# Patient Record
Sex: FEMALE | Race: WHITE | Employment: OTHER | URBAN - NONMETROPOLITAN AREA
[De-identification: names, ages, dates, MRNs, and addresses within clinical notes are randomized per-mention and may not be internally consistent; named-entity substitution may affect disease eponyms.]

---

## 2017-04-06 PROBLEM — N17.9 AKI (ACUTE KIDNEY INJURY) (HCC): Status: ACTIVE | Noted: 2017-04-06

## 2017-04-06 PROBLEM — R19.7 DIARRHEA: Status: ACTIVE | Noted: 2017-04-06

## 2017-04-07 PROBLEM — Z86.718 HISTORY OF DVT (DEEP VEIN THROMBOSIS): Status: ACTIVE | Noted: 2017-04-07

## 2017-04-07 PROBLEM — N30.01 ACUTE CYSTITIS WITH HEMATURIA: Status: ACTIVE | Noted: 2017-04-07

## 2018-01-01 ENCOUNTER — OFFICE VISIT (OUTPATIENT)
Dept: NEPHROLOGY | Age: 83
End: 2018-01-01
Payer: MEDICARE

## 2018-01-01 ENCOUNTER — TELEPHONE (OUTPATIENT)
Dept: NEPHROLOGY | Age: 83
End: 2018-01-01

## 2018-01-01 ENCOUNTER — APPOINTMENT (OUTPATIENT)
Dept: INTERVENTIONAL RADIOLOGY/VASCULAR | Age: 83
DRG: 291 | End: 2018-01-01
Payer: MEDICARE

## 2018-01-01 ENCOUNTER — APPOINTMENT (OUTPATIENT)
Dept: GENERAL RADIOLOGY | Age: 83
DRG: 291 | End: 2018-01-01
Attending: INTERNAL MEDICINE
Payer: MEDICARE

## 2018-01-01 ENCOUNTER — HOSPITAL ENCOUNTER (INPATIENT)
Age: 83
LOS: 4 days | Discharge: HOME HEALTH | DRG: 291 | End: 2018-03-26
Attending: INTERNAL MEDICINE | Admitting: INTERNAL MEDICINE
Payer: MEDICARE

## 2018-01-01 ENCOUNTER — HOSPITAL ENCOUNTER (INPATIENT)
Age: 83
LOS: 3 days | Discharge: HOME | DRG: 683 | End: 2018-01-13
Attending: INTERNAL MEDICINE | Admitting: INTERNAL MEDICINE
Payer: MEDICARE

## 2018-01-01 VITALS
WEIGHT: 88.2 LBS | DIASTOLIC BLOOD PRESSURE: 74 MMHG | BODY MASS INDEX: 17.81 KG/M2 | HEART RATE: 80 BPM | SYSTOLIC BLOOD PRESSURE: 116 MMHG | OXYGEN SATURATION: 98 %

## 2018-01-01 VITALS
DIASTOLIC BLOOD PRESSURE: 88 MMHG | WEIGHT: 90.8 LBS | HEART RATE: 65 BPM | BODY MASS INDEX: 18.98 KG/M2 | OXYGEN SATURATION: 98 % | SYSTOLIC BLOOD PRESSURE: 162 MMHG

## 2018-01-01 VITALS
HEART RATE: 65 BPM | RESPIRATION RATE: 18 BRPM | BODY MASS INDEX: 20.3 KG/M2 | OXYGEN SATURATION: 92 % | WEIGHT: 96.7 LBS | SYSTOLIC BLOOD PRESSURE: 148 MMHG | TEMPERATURE: 98.4 F | HEIGHT: 58 IN | DIASTOLIC BLOOD PRESSURE: 54 MMHG

## 2018-01-01 VITALS
RESPIRATION RATE: 16 BRPM | WEIGHT: 93 LBS | OXYGEN SATURATION: 97 % | DIASTOLIC BLOOD PRESSURE: 67 MMHG | BODY MASS INDEX: 18.75 KG/M2 | HEART RATE: 61 BPM | HEIGHT: 59 IN | TEMPERATURE: 98.4 F | SYSTOLIC BLOOD PRESSURE: 168 MMHG

## 2018-01-01 VITALS
BODY MASS INDEX: 21.27 KG/M2 | OXYGEN SATURATION: 93 % | WEIGHT: 105.3 LBS | DIASTOLIC BLOOD PRESSURE: 78 MMHG | SYSTOLIC BLOOD PRESSURE: 124 MMHG | HEART RATE: 51 BPM

## 2018-01-01 DIAGNOSIS — N18.4 ANEMIA IN STAGE 4 CHRONIC KIDNEY DISEASE (HCC): ICD-10-CM

## 2018-01-01 DIAGNOSIS — N18.4 CHRONIC KIDNEY DISEASE, STAGE IV (SEVERE) (HCC): Primary | ICD-10-CM

## 2018-01-01 DIAGNOSIS — N17.9 AKI (ACUTE KIDNEY INJURY) (HCC): Primary | ICD-10-CM

## 2018-01-01 DIAGNOSIS — E87.20 METABOLIC ACIDOSIS: ICD-10-CM

## 2018-01-01 DIAGNOSIS — N18.4 CKD (CHRONIC KIDNEY DISEASE), STAGE IV (HCC): Primary | ICD-10-CM

## 2018-01-01 DIAGNOSIS — I10 ESSENTIAL HYPERTENSION: ICD-10-CM

## 2018-01-01 DIAGNOSIS — N17.9 AKI (ACUTE KIDNEY INJURY) (HCC): ICD-10-CM

## 2018-01-01 DIAGNOSIS — D63.1 ANEMIA IN STAGE 4 CHRONIC KIDNEY DISEASE (HCC): ICD-10-CM

## 2018-01-01 DIAGNOSIS — N39.0 URINARY TRACT INFECTION WITH HEMATURIA, SITE UNSPECIFIED: ICD-10-CM

## 2018-01-01 DIAGNOSIS — N18.4 CKD (CHRONIC KIDNEY DISEASE), STAGE IV (HCC): ICD-10-CM

## 2018-01-01 DIAGNOSIS — R31.9 URINARY TRACT INFECTION WITH HEMATURIA, SITE UNSPECIFIED: ICD-10-CM

## 2018-01-01 DIAGNOSIS — N17.9 ACUTE KIDNEY INJURY (HCC): Primary | ICD-10-CM

## 2018-01-01 DIAGNOSIS — J90 PLEURAL EFFUSION, BILATERAL: ICD-10-CM

## 2018-01-01 LAB
ALBUMIN SERPL-MCNC: 2.8 G/DL (ref 3.5–5.1)
ALBUMIN SERPL-MCNC: 3.2 G/DL (ref 3.5–5.1)
ALP BLD-CCNC: 111 U/L (ref 38–126)
ALT SERPL-CCNC: 20 U/L (ref 11–66)
AMORPHOUS: ABNORMAL
ANION GAP SERPL CALCULATED.3IONS-SCNC: 12 MEQ/L (ref 8–16)
ANION GAP SERPL CALCULATED.3IONS-SCNC: 12 MEQ/L (ref 8–16)
ANION GAP SERPL CALCULATED.3IONS-SCNC: 13 MEQ/L (ref 8–16)
ANION GAP SERPL CALCULATED.3IONS-SCNC: 15 MEQ/L (ref 8–16)
ANION GAP SERPL CALCULATED.3IONS-SCNC: 15 MEQ/L (ref 8–16)
ANION GAP SERPL CALCULATED.3IONS-SCNC: 16 MEQ/L (ref 8–16)
ANION GAP SERPL CALCULATED.3IONS-SCNC: 16 MEQ/L (ref 8–16)
ANION GAP SERPL CALCULATED.3IONS-SCNC: 17 MEQ/L (ref 8–16)
ANION GAP SERPL CALCULATED.3IONS-SCNC: 18 MEQ/L (ref 8–16)
ANISOCYTOSIS: ABNORMAL
AST SERPL-CCNC: 23 U/L (ref 5–40)
BACTERIA: ABNORMAL /HPF
BASOPHILIA: SLIGHT
BASOPHILS # BLD: 0.3 %
BASOPHILS # BLD: 1 %
BASOPHILS ABSOLUTE: 0 THOU/MM3 (ref 0–0.1)
BASOPHILS ABSOLUTE: 0.1 THOU/MM3 (ref 0–0.1)
BASOPHILS ABSOLUTE: ABNORMAL /ΜL
BASOPHILS ABSOLUTE: ABNORMAL /ΜL
BASOPHILS RELATIVE PERCENT: ABNORMAL %
BASOPHILS RELATIVE PERCENT: ABNORMAL %
BILIRUB SERPL-MCNC: 0.2 MG/DL (ref 0.3–1.2)
BILIRUBIN DIRECT: < 0.2 MG/DL (ref 0–0.3)
BILIRUBIN URINE: NEGATIVE
BILIRUBIN, URINE: POSITIVE
BLOOD CULTURE, ROUTINE: NORMAL
BLOOD, URINE: ABNORMAL
BLOOD, URINE: POSITIVE
BUN BLDV-MCNC: 24 MG/DL (ref 7–22)
BUN BLDV-MCNC: 26 MG/DL
BUN BLDV-MCNC: 26 MG/DL (ref 7–22)
BUN BLDV-MCNC: 28 MG/DL
BUN BLDV-MCNC: 28 MG/DL (ref 7–22)
BUN BLDV-MCNC: 30 MG/DL
BUN BLDV-MCNC: 32 MG/DL
BUN BLDV-MCNC: 32 MG/DL (ref 7–22)
BUN BLDV-MCNC: 33 MG/DL (ref 7–22)
BUN BLDV-MCNC: 36 MG/DL (ref 7–22)
BUN BLDV-MCNC: 36 MG/DL (ref 7–22)
BUN BLDV-MCNC: 38 MG/DL
CALCIUM IONIZED: 1.13 MMOL/L (ref 1.12–1.32)
CALCIUM SERPL-MCNC: 6.8 MG/DL
CALCIUM SERPL-MCNC: 7.1 MG/DL (ref 8.5–10.5)
CALCIUM SERPL-MCNC: 7.2 MG/DL (ref 8.5–10.5)
CALCIUM SERPL-MCNC: 7.5 MG/DL (ref 8.5–10.5)
CALCIUM SERPL-MCNC: 7.6 MG/DL (ref 8.5–10.5)
CALCIUM SERPL-MCNC: 8.3 MG/DL
CALCIUM SERPL-MCNC: 8.4 MG/DL (ref 8.5–10.5)
CALCIUM SERPL-MCNC: 8.5 MG/DL (ref 8.5–10.5)
CALCIUM SERPL-MCNC: 8.7 MG/DL
CALCIUM SERPL-MCNC: 8.7 MG/DL (ref 8.5–10.5)
CALCIUM SERPL-MCNC: 9.1 MG/DL
CALCIUM SERPL-MCNC: 9.2 MG/DL
CALCIUM SERPL-MCNC: 9.2 MG/DL (ref 8.5–10.5)
CALCIUM SERPL-MCNC: 9.3 MG/DL (ref 8.5–10.5)
CASTS 2: ABNORMAL /LPF
CASTS UA: ABNORMAL /LPF
CHARACTER, URINE: ABNORMAL
CHLORIDE BLD-SCNC: 102 MEQ/L (ref 98–111)
CHLORIDE BLD-SCNC: 103 MEQ/L (ref 98–111)
CHLORIDE BLD-SCNC: 104 MMOL/L
CHLORIDE BLD-SCNC: 106 MMOL/L
CHLORIDE BLD-SCNC: 109 MMOL/L
CHLORIDE BLD-SCNC: 111 MEQ/L (ref 98–111)
CHLORIDE BLD-SCNC: 111 MEQ/L (ref 98–111)
CHLORIDE BLD-SCNC: 112 MEQ/L (ref 98–111)
CHLORIDE BLD-SCNC: 113 MMOL/L
CHLORIDE BLD-SCNC: 115 MEQ/L (ref 98–111)
CHLORIDE BLD-SCNC: 90 MEQ/L (ref 98–111)
CHLORIDE BLD-SCNC: 93 MEQ/L (ref 98–111)
CHLORIDE BLD-SCNC: 93 MEQ/L (ref 98–111)
CHLORIDE BLD-SCNC: 99 MMOL/L
CHLORIDE, URINE: 35 MEQ/L
CLARITY: ABNORMAL
CO2: 18 MEQ/L (ref 23–33)
CO2: 18 MEQ/L (ref 23–33)
CO2: 18 MMOL/L
CO2: 19 MEQ/L (ref 23–33)
CO2: 19 MMOL/L
CO2: 20 MEQ/L (ref 23–33)
CO2: 23 MMOL/L
CO2: 24 MEQ/L (ref 23–33)
CO2: 24 MEQ/L (ref 23–33)
CO2: 25 MEQ/L (ref 23–33)
CO2: 26 MEQ/L (ref 23–33)
CO2: 27 MMOL/L
CO2: 29 MEQ/L (ref 23–33)
CO2: 30 MMOL/L
COLOR: ABNORMAL
COLOR: ABNORMAL
CREAT SERPL-MCNC: 1.6 MG/DL (ref 0.4–1.2)
CREAT SERPL-MCNC: 1.9 MG/DL (ref 0.4–1.2)
CREAT SERPL-MCNC: 2.3 MG/DL
CREAT SERPL-MCNC: 2.3 MG/DL (ref 0.4–1.2)
CREAT SERPL-MCNC: 2.4 MG/DL
CREAT SERPL-MCNC: 2.4 MG/DL (ref 0.4–1.2)
CREAT SERPL-MCNC: 2.5 MG/DL
CREAT SERPL-MCNC: 2.5 MG/DL (ref 0.4–1.2)
CREAT SERPL-MCNC: 2.7 MG/DL (ref 0.4–1.2)
CREAT SERPL-MCNC: 2.8 MG/DL
CREAT SERPL-MCNC: 2.8 MG/DL (ref 0.4–1.2)
CREAT SERPL-MCNC: 3 MG/DL (ref 0.4–1.2)
CREAT SERPL-MCNC: 3.2 MG/DL (ref 0.4–1.2)
CREAT SERPL-MCNC: 3.8 MG/DL
CREATININE URINE: 66.8 MG/DL
CRYSTALS, UA: ABNORMAL
D-DIMER QUANTITATIVE: 855 NG/ML FEU (ref 0–500)
EKG ATRIAL RATE: 150 BPM
EKG ATRIAL RATE: 57 BPM
EKG ATRIAL RATE: 64 BPM
EKG P AXIS: 19 DEGREES
EKG P AXIS: 52 DEGREES
EKG P-R INTERVAL: 146 MS
EKG P-R INTERVAL: 170 MS
EKG Q-T INTERVAL: 336 MS
EKG Q-T INTERVAL: 424 MS
EKG Q-T INTERVAL: 496 MS
EKG QRS DURATION: 76 MS
EKG QRS DURATION: 92 MS
EKG QRS DURATION: 94 MS
EKG QTC CALCULATION (BAZETT): 437 MS
EKG QTC CALCULATION (BAZETT): 482 MS
EKG QTC CALCULATION (BAZETT): 501 MS
EKG R AXIS: -26 DEGREES
EKG R AXIS: -44 DEGREES
EKG R AXIS: -48 DEGREES
EKG T AXIS: -158 DEGREES
EKG T AXIS: -3 DEGREES
EKG T AXIS: 67 DEGREES
EKG VENTRICULAR RATE: 134 BPM
EKG VENTRICULAR RATE: 57 BPM
EKG VENTRICULAR RATE: 64 BPM
EOSINOPHIL # BLD: 1.5 %
EOSINOPHIL # BLD: 3 %
EOSINOPHILS ABSOLUTE: 0.1 THOU/MM3 (ref 0–0.4)
EOSINOPHILS ABSOLUTE: 0.2 THOU/MM3 (ref 0–0.4)
EOSINOPHILS ABSOLUTE: ABNORMAL /ΜL
EOSINOPHILS ABSOLUTE: ABNORMAL /ΜL
EOSINOPHILS RELATIVE PERCENT: ABNORMAL %
EOSINOPHILS RELATIVE PERCENT: ABNORMAL %
EPITHELIAL CELLS, UA: ABNORMAL /HPF
FERRITIN: 291 NG/ML (ref 10–291)
FLU A ANTIGEN: NEGATIVE
FLU B ANTIGEN: NEGATIVE
GFR CALCULATED: 12
GFR CALCULATED: 17
GFR CALCULATED: 19
GFR CALCULATED: 20
GFR CALCULATED: 21
GFR SERPL CREATININE-BSD FRML MDRD: 14 ML/MIN/1.73M2
GFR SERPL CREATININE-BSD FRML MDRD: 15 ML/MIN/1.73M2
GFR SERPL CREATININE-BSD FRML MDRD: 16 ML/MIN/1.73M2
GFR SERPL CREATININE-BSD FRML MDRD: 17 ML/MIN/1.73M2
GFR SERPL CREATININE-BSD FRML MDRD: 18 ML/MIN/1.73M2
GFR SERPL CREATININE-BSD FRML MDRD: 19 ML/MIN/1.73M2
GFR SERPL CREATININE-BSD FRML MDRD: 20 ML/MIN/1.73M2
GFR SERPL CREATININE-BSD FRML MDRD: 25 ML/MIN/1.73M2
GFR SERPL CREATININE-BSD FRML MDRD: 30 ML/MIN/1.73M2
GLUCOSE BLD-MCNC: 104 MG/DL
GLUCOSE BLD-MCNC: 105 MG/DL (ref 70–108)
GLUCOSE BLD-MCNC: 108 MG/DL (ref 70–108)
GLUCOSE BLD-MCNC: 115 MG/DL
GLUCOSE BLD-MCNC: 116 MG/DL (ref 70–108)
GLUCOSE BLD-MCNC: 79 MG/DL (ref 70–108)
GLUCOSE BLD-MCNC: 83 MG/DL
GLUCOSE BLD-MCNC: 83 MG/DL (ref 70–108)
GLUCOSE BLD-MCNC: 86 MG/DL (ref 70–108)
GLUCOSE BLD-MCNC: 86 MG/DL (ref 70–108)
GLUCOSE BLD-MCNC: 88 MG/DL (ref 70–108)
GLUCOSE BLD-MCNC: 90 MG/DL (ref 70–108)
GLUCOSE BLD-MCNC: 93 MG/DL
GLUCOSE BLD-MCNC: 97 MG/DL
GLUCOSE URINE: 100
GLUCOSE URINE: NEGATIVE MG/DL
HCT VFR BLD CALC: 27.6 % (ref 37–47)
HCT VFR BLD CALC: 28.7 % (ref 37–47)
HCT VFR BLD CALC: 29.2 % (ref 37–47)
HCT VFR BLD CALC: 29.8 % (ref 36–46)
HCT VFR BLD CALC: 31.5 % (ref 36–46)
HCT VFR BLD CALC: 32 % (ref 37–47)
HEMOGLOBIN: 10 G/DL (ref 12–16)
HEMOGLOBIN: 10.8 GM/DL (ref 12–16)
HEMOGLOBIN: 8.9 G/DL (ref 12–16)
HEMOGLOBIN: 8.9 GM/DL (ref 12–16)
HEMOGLOBIN: 9 GM/DL (ref 12–16)
HEMOGLOBIN: 9.4 GM/DL (ref 12–16)
IMMUNOFIXATION ELECTROPHORESIS: NORMAL
INR BLD: 1.15 (ref 0.85–1.13)
INR BLD: 1.29 (ref 0.85–1.13)
INR BLD: 1.31 (ref 0.85–1.13)
INR BLD: 1.43 (ref 0.85–1.13)
INR BLD: 1.7
INR BLD: 2.02 (ref 0.85–1.13)
INR BLD: 2.05 (ref 0.85–1.13)
INR BLD: 2.07 (ref 0.85–1.13)
INR BLD: 2.23 (ref 0.85–1.13)
INR BLD: 2.49 (ref 0.85–1.13)
IRON SATURATION: 43 % (ref 20–50)
IRON: 64 UG/DL (ref 50–170)
KAPPA/LAMBDA FREE LIGHT CHAINS: NORMAL
KETONES, URINE: NEGATIVE
KETONES, URINE: NEGATIVE
LEGIONELLA URINARY AG: NEGATIVE
LEUKOCYTE ESTERASE, URINE: ABNORMAL
LEUKOCYTE ESTERASE, URINE: POSITIVE
LV EF: 64 %
LVEF MODALITY: NORMAL
LYMPHOCYTES # BLD: 20.9 %
LYMPHOCYTES # BLD: 23.8 %
LYMPHOCYTES ABSOLUTE: 1.7 THOU/MM3 (ref 1–4.8)
LYMPHOCYTES ABSOLUTE: 2.1 THOU/MM3 (ref 1–4.8)
LYMPHOCYTES ABSOLUTE: ABNORMAL /ΜL
LYMPHOCYTES ABSOLUTE: ABNORMAL /ΜL
LYMPHOCYTES RELATIVE PERCENT: 2.8 %
LYMPHOCYTES RELATIVE PERCENT: ABNORMAL %
MACROCYTES: ABNORMAL
MAGNESIUM: 1.5 MG/DL (ref 1.6–2.4)
MAGNESIUM: 1.9 MG/DL (ref 1.6–2.4)
MCH RBC QN AUTO: 31.2 PG (ref 27–31)
MCH RBC QN AUTO: 32.9 PG (ref 27–31)
MCH RBC QN AUTO: 33.1 PG (ref 27–31)
MCH RBC QN AUTO: 33.4 PG (ref 27–31)
MCH RBC QN AUTO: ABNORMAL PG
MCH RBC QN AUTO: ABNORMAL PG
MCHC RBC AUTO-ENTMCNC: 31.1 GM/DL (ref 33–37)
MCHC RBC AUTO-ENTMCNC: 32.1 GM/DL (ref 33–37)
MCHC RBC AUTO-ENTMCNC: 32.7 GM/DL (ref 33–37)
MCHC RBC AUTO-ENTMCNC: 33.8 GM/DL (ref 33–37)
MCHC RBC AUTO-ENTMCNC: ABNORMAL G/DL
MCHC RBC AUTO-ENTMCNC: ABNORMAL G/DL
MCV RBC AUTO: 100.5 FL (ref 81–99)
MCV RBC AUTO: 101.4 FL (ref 81–99)
MCV RBC AUTO: 102.5 FL (ref 81–99)
MCV RBC AUTO: 98.8 FL (ref 81–99)
MCV RBC AUTO: ABNORMAL FL
MCV RBC AUTO: ABNORMAL FL
MISCELLANEOUS 2: ABNORMAL
MONOCYTES # BLD: 10.1 %
MONOCYTES # BLD: 8.8 %
MONOCYTES ABSOLUTE: 0.8 THOU/MM3 (ref 0.4–1.3)
MONOCYTES ABSOLUTE: 0.8 THOU/MM3 (ref 0.4–1.3)
MONOCYTES ABSOLUTE: ABNORMAL /ΜL
MONOCYTES ABSOLUTE: ABNORMAL /ΜL
MONOCYTES RELATIVE PERCENT: 0.4 %
MONOCYTES RELATIVE PERCENT: ABNORMAL %
MRSA SCREEN RT-PCR: NEGATIVE
MRSA SCREEN: NORMAL
NEUTROPHILS ABSOLUTE: ABNORMAL /ΜL
NEUTROPHILS ABSOLUTE: ABNORMAL /ΜL
NEUTROPHILS RELATIVE PERCENT: 2.9 %
NEUTROPHILS RELATIVE PERCENT: ABNORMAL %
NITRITE, URINE: POSITIVE
NITRITE, URINE: POSITIVE
NUCLEATED RED BLOOD CELLS: 0 /100 WBC
NUCLEATED RED BLOOD CELLS: 0 /100 WBC
OSMOLALITY CALCULATION: 293.1 MOSMOL/KG (ref 275–300)
OSMOLALITY CALCULATION: 295.3 MOSMOL/KG (ref 275–300)
PDW BLD-RTO: 14.1 % (ref 11.5–14.5)
PDW BLD-RTO: 16.4 % (ref 11.5–14.5)
PDW BLD-RTO: 16.4 % (ref 11.5–14.5)
PDW BLD-RTO: 18.1 % (ref 11.5–14.5)
PH UA: 5.5 (ref 4.5–8)
PH UA: 6
PH VENOUS: 7.4 (ref 7.33–7.43)
PLATELET # BLD: 133 THOU/MM3 (ref 130–400)
PLATELET # BLD: 144 THOU/MM3 (ref 130–400)
PLATELET # BLD: 148 THOU/MM3 (ref 130–400)
PLATELET # BLD: 158 THOU/MM3 (ref 130–400)
PLATELET # BLD: 177 K/ΜL
PLATELET # BLD: 188 K/ΜL
PLATELET ESTIMATE: ADEQUATE
PMV BLD AUTO: 8.1 MCM (ref 7.4–10.4)
PMV BLD AUTO: 8.3 MCM (ref 7.4–10.4)
PMV BLD AUTO: 8.4 FL (ref 7.4–10.4)
PMV BLD AUTO: 8.6 MCM (ref 7.4–10.4)
PMV BLD AUTO: ABNORMAL FL
PMV BLD AUTO: ABNORMAL FL
POTASSIUM REFLEX MAGNESIUM: 3.4 MEQ/L (ref 3.5–5.2)
POTASSIUM REFLEX MAGNESIUM: 3.7 MEQ/L (ref 3.5–5.2)
POTASSIUM REFLEX MAGNESIUM: 4.4 MEQ/L (ref 3.5–5.2)
POTASSIUM SERPL-SCNC: 3.5 MEQ/L (ref 3.5–5.2)
POTASSIUM SERPL-SCNC: 3.6 MMOL/L
POTASSIUM SERPL-SCNC: 3.7 MEQ/L (ref 3.5–5.2)
POTASSIUM SERPL-SCNC: 3.8 MEQ/L (ref 3.5–5.2)
POTASSIUM SERPL-SCNC: 3.8 MMOL/L
POTASSIUM SERPL-SCNC: 4.1 MEQ/L (ref 3.5–5.2)
POTASSIUM SERPL-SCNC: 4.1 MMOL/L
POTASSIUM SERPL-SCNC: 4.3 MMOL/L
POTASSIUM SERPL-SCNC: 4.4 MEQ/L (ref 3.5–5.2)
POTASSIUM SERPL-SCNC: 4.8 MMOL/L
POTASSIUM SERPL-SCNC: 5 MEQ/L (ref 3.5–5.2)
POTASSIUM SERPL-SCNC: 5.3 MEQ/L (ref 3.5–5.2)
PRO-BNP: ABNORMAL PG/ML (ref 0–1800)
PROCALCITONIN: 0.41 NG/ML (ref 0.01–0.09)
PROT/CREAT RATIO, UR: 0.56
PROTEIN UA: 100
PROTEIN UA: ABNORMAL
PROTEIN, URINE: 37.6 MG/DL
PROTIME: NORMAL SECONDS
PTH INTACT: 99.4 PG/ML (ref 15–65)
RBC # BLD: 2.73 MILL/MM3 (ref 4.2–5.4)
RBC # BLD: 2.75 10^6/ΜL
RBC # BLD: 2.85 MILL/MM3 (ref 4.2–5.4)
RBC # BLD: 2.86 MILL/MM3 (ref 4.2–5.4)
RBC # BLD: 3.06 10^6/ΜL
RBC # BLD: 3.24 MILL/MM3 (ref 4.2–5.4)
RBC URINE: ABNORMAL /HPF
RENAL EPITHELIAL, UA: ABNORMAL
SCAN OF BLOOD SMEAR: NORMAL
SEG NEUTROPHILS: 65 %
SEG NEUTROPHILS: 65.6 %
SEGMENTED NEUTROPHILS ABSOLUTE COUNT: 5.2 THOU/MM3 (ref 1.8–7.7)
SEGMENTED NEUTROPHILS ABSOLUTE COUNT: 5.7 THOU/MM3 (ref 1.8–7.7)
SODIUM BLD-SCNC: 132 MEQ/L (ref 135–145)
SODIUM BLD-SCNC: 133 MEQ/L (ref 135–145)
SODIUM BLD-SCNC: 134 MEQ/L (ref 135–145)
SODIUM BLD-SCNC: 142 MMOL/L
SODIUM BLD-SCNC: 143 MEQ/L (ref 135–145)
SODIUM BLD-SCNC: 143 MMOL/L
SODIUM BLD-SCNC: 143 MMOL/L
SODIUM BLD-SCNC: 144 MEQ/L (ref 135–145)
SODIUM BLD-SCNC: 144 MMOL/L
SODIUM BLD-SCNC: 144 MMOL/L
SODIUM BLD-SCNC: 145 MEQ/L (ref 135–145)
SODIUM BLD-SCNC: 146 MEQ/L (ref 135–145)
SODIUM URINE: 134 MEQ/L
SODIUM URINE: 76 MEQ/L
SPECIFIC GRAVITY, URINE: 1.01
SPECIFIC GRAVITY, URINE: 1.01 (ref 1–1.03)
STREP PNEUMO AG, UR: NEGATIVE
THYROXINE (T4): 5.9 UG/DL (ref 4.5–12)
TOTAL IRON BINDING CAPACITY: 150 UG/DL (ref 171–450)
TOTAL PROTEIN: 5.7 G/DL (ref 6.1–8)
TROPONIN T: 0.01 NG/ML
TROPONIN T: 0.02 NG/ML
TROPONIN T: 0.02 NG/ML
TSH SERPL DL<=0.05 MIU/L-ACNC: 2.34 UIU/ML (ref 0.4–4.2)
URINE CULTURE REFLEX: NORMAL
UROBILINOGEN, URINE: 1 EU/DL
UROBILINOGEN, URINE: NORMAL
VITAMIN D 25-HYDROXY: 41 NG/ML (ref 30–100)
VRE CULTURE: NORMAL
WBC # BLD: 5.7 THOU/MM3 (ref 4.8–10.8)
WBC # BLD: 6.1 10^3/ML
WBC # BLD: 6.6 10^3/ML
WBC # BLD: 8 THOU/MM3 (ref 4.8–10.8)
WBC # BLD: 8 THOU/MM3 (ref 4.8–10.8)
WBC # BLD: 8.7 THOU/MM3 (ref 4.8–10.8)
WBC UA: ABNORMAL /HPF
YEAST: ABNORMAL

## 2018-01-01 PROCEDURE — 6360000002 HC RX W HCPCS: Performed by: PHYSICIAN ASSISTANT

## 2018-01-01 PROCEDURE — 99232 SBSQ HOSP IP/OBS MODERATE 35: CPT | Performed by: INTERNAL MEDICINE

## 2018-01-01 PROCEDURE — 80048 BASIC METABOLIC PNL TOTAL CA: CPT

## 2018-01-01 PROCEDURE — 2580000003 HC RX 258: Performed by: NURSE PRACTITIONER

## 2018-01-01 PROCEDURE — 93306 TTE W/DOPPLER COMPLETE: CPT

## 2018-01-01 PROCEDURE — 81001 URINALYSIS AUTO W/SCOPE: CPT

## 2018-01-01 PROCEDURE — 6370000000 HC RX 637 (ALT 250 FOR IP): Performed by: INTERNAL MEDICINE

## 2018-01-01 PROCEDURE — 2060000000 HC ICU INTERMEDIATE R&B

## 2018-01-01 PROCEDURE — 87147 CULTURE TYPE IMMUNOLOGIC: CPT

## 2018-01-01 PROCEDURE — 99223 1ST HOSP IP/OBS HIGH 75: CPT | Performed by: INTERNAL MEDICINE

## 2018-01-01 PROCEDURE — 84165 PROTEIN E-PHORESIS SERUM: CPT

## 2018-01-01 PROCEDURE — 87449 NOS EACH ORGANISM AG IA: CPT

## 2018-01-01 PROCEDURE — 6370000000 HC RX 637 (ALT 250 FOR IP)

## 2018-01-01 PROCEDURE — 2580000003 HC RX 258: Performed by: INTERNAL MEDICINE

## 2018-01-01 PROCEDURE — G8978 MOBILITY CURRENT STATUS: HCPCS

## 2018-01-01 PROCEDURE — 87899 AGENT NOS ASSAY W/OPTIC: CPT

## 2018-01-01 PROCEDURE — 2700000000 HC OXYGEN THERAPY PER DAY

## 2018-01-01 PROCEDURE — 36415 COLL VENOUS BLD VENIPUNCTURE: CPT

## 2018-01-01 PROCEDURE — 97110 THERAPEUTIC EXERCISES: CPT

## 2018-01-01 PROCEDURE — 93971 EXTREMITY STUDY: CPT

## 2018-01-01 PROCEDURE — 85610 PROTHROMBIN TIME: CPT

## 2018-01-01 PROCEDURE — 84484 ASSAY OF TROPONIN QUANT: CPT

## 2018-01-01 PROCEDURE — 97165 OT EVAL LOW COMPLEX 30 MIN: CPT

## 2018-01-01 PROCEDURE — 85379 FIBRIN DEGRADATION QUANT: CPT

## 2018-01-01 PROCEDURE — 82800 BLOOD PH: CPT

## 2018-01-01 PROCEDURE — 84443 ASSAY THYROID STIM HORMONE: CPT

## 2018-01-01 PROCEDURE — 85025 COMPLETE CBC W/AUTO DIFF WBC: CPT

## 2018-01-01 PROCEDURE — 97162 PT EVAL MOD COMPLEX 30 MIN: CPT

## 2018-01-01 PROCEDURE — 83540 ASSAY OF IRON: CPT

## 2018-01-01 PROCEDURE — 97116 GAIT TRAINING THERAPY: CPT

## 2018-01-01 PROCEDURE — 2580000003 HC RX 258: Performed by: EMERGENCY MEDICINE

## 2018-01-01 PROCEDURE — 84300 ASSAY OF URINE SODIUM: CPT

## 2018-01-01 PROCEDURE — 6360000002 HC RX W HCPCS: Performed by: INTERNAL MEDICINE

## 2018-01-01 PROCEDURE — 6370000000 HC RX 637 (ALT 250 FOR IP): Performed by: PHARMACIST

## 2018-01-01 PROCEDURE — 85027 COMPLETE CBC AUTOMATED: CPT

## 2018-01-01 PROCEDURE — 87641 MR-STAPH DNA AMP PROBE: CPT

## 2018-01-01 PROCEDURE — 94640 AIRWAY INHALATION TREATMENT: CPT

## 2018-01-01 PROCEDURE — 83970 ASSAY OF PARATHORMONE: CPT

## 2018-01-01 PROCEDURE — 6370000000 HC RX 637 (ALT 250 FOR IP): Performed by: FAMILY MEDICINE

## 2018-01-01 PROCEDURE — APPSS60 APP SPLIT SHARED TIME 46-60 MINUTES: Performed by: NURSE PRACTITIONER

## 2018-01-01 PROCEDURE — 87804 INFLUENZA ASSAY W/OPTIC: CPT

## 2018-01-01 PROCEDURE — 99239 HOSP IP/OBS DSCHRG MGMT >30: CPT | Performed by: INTERNAL MEDICINE

## 2018-01-01 PROCEDURE — 82248 BILIRUBIN DIRECT: CPT

## 2018-01-01 PROCEDURE — 71046 X-RAY EXAM CHEST 2 VIEWS: CPT

## 2018-01-01 PROCEDURE — 80053 COMPREHEN METABOLIC PANEL: CPT

## 2018-01-01 PROCEDURE — 1200000003 HC TELEMETRY R&B

## 2018-01-01 PROCEDURE — 82784 ASSAY IGA/IGD/IGG/IGM EACH: CPT

## 2018-01-01 PROCEDURE — G8979 MOBILITY GOAL STATUS: HCPCS

## 2018-01-01 PROCEDURE — 93010 ELECTROCARDIOGRAM REPORT: CPT | Performed by: NUCLEAR MEDICINE

## 2018-01-01 PROCEDURE — APPSS30 APP SPLIT SHARED TIME 16-30 MINUTES: Performed by: NURSE PRACTITIONER

## 2018-01-01 PROCEDURE — 84436 ASSAY OF TOTAL THYROXINE: CPT

## 2018-01-01 PROCEDURE — 6370000000 HC RX 637 (ALT 250 FOR IP): Performed by: NURSE PRACTITIONER

## 2018-01-01 PROCEDURE — 87040 BLOOD CULTURE FOR BACTERIA: CPT

## 2018-01-01 PROCEDURE — 97166 OT EVAL MOD COMPLEX 45 MIN: CPT

## 2018-01-01 PROCEDURE — 87081 CULTURE SCREEN ONLY: CPT

## 2018-01-01 PROCEDURE — 99222 1ST HOSP IP/OBS MODERATE 55: CPT | Performed by: INTERNAL MEDICINE

## 2018-01-01 PROCEDURE — 99214 OFFICE O/P EST MOD 30 MIN: CPT | Performed by: INTERNAL MEDICINE

## 2018-01-01 PROCEDURE — 82728 ASSAY OF FERRITIN: CPT

## 2018-01-01 PROCEDURE — 82040 ASSAY OF SERUM ALBUMIN: CPT

## 2018-01-01 PROCEDURE — 84156 ASSAY OF PROTEIN URINE: CPT

## 2018-01-01 PROCEDURE — 97530 THERAPEUTIC ACTIVITIES: CPT

## 2018-01-01 PROCEDURE — 82570 ASSAY OF URINE CREATININE: CPT

## 2018-01-01 PROCEDURE — 99222 1ST HOSP IP/OBS MODERATE 55: CPT | Performed by: PHYSICIAN ASSISTANT

## 2018-01-01 PROCEDURE — 6360000002 HC RX W HCPCS: Performed by: NURSE PRACTITIONER

## 2018-01-01 PROCEDURE — 82306 VITAMIN D 25 HYDROXY: CPT

## 2018-01-01 PROCEDURE — 99213 OFFICE O/P EST LOW 20 MIN: CPT | Performed by: INTERNAL MEDICINE

## 2018-01-01 PROCEDURE — 99284 EMERGENCY DEPT VISIT MOD MDM: CPT

## 2018-01-01 PROCEDURE — 82330 ASSAY OF CALCIUM: CPT

## 2018-01-01 PROCEDURE — 86334 IMMUNOFIX E-PHORESIS SERUM: CPT

## 2018-01-01 PROCEDURE — 99221 1ST HOSP IP/OBS SF/LOW 40: CPT | Performed by: INTERNAL MEDICINE

## 2018-01-01 PROCEDURE — G8987 SELF CARE CURRENT STATUS: HCPCS

## 2018-01-01 PROCEDURE — 94010 BREATHING CAPACITY TEST: CPT

## 2018-01-01 PROCEDURE — 99238 HOSP IP/OBS DSCHRG MGMT 30/<: CPT | Performed by: INTERNAL MEDICINE

## 2018-01-01 PROCEDURE — 6360000002 HC RX W HCPCS: Performed by: EMERGENCY MEDICINE

## 2018-01-01 PROCEDURE — 94669 MECHANICAL CHEST WALL OSCILL: CPT

## 2018-01-01 PROCEDURE — 1200000000 HC SEMI PRIVATE

## 2018-01-01 PROCEDURE — 93005 ELECTROCARDIOGRAM TRACING: CPT | Performed by: INTERNAL MEDICINE

## 2018-01-01 PROCEDURE — 96374 THER/PROPH/DIAG INJ IV PUSH: CPT

## 2018-01-01 PROCEDURE — 99232 SBSQ HOSP IP/OBS MODERATE 35: CPT | Performed by: NURSE PRACTITIONER

## 2018-01-01 PROCEDURE — 84160 ASSAY OF PROTEIN ANY SOURCE: CPT

## 2018-01-01 PROCEDURE — 83883 ASSAY NEPHELOMETRY NOT SPEC: CPT

## 2018-01-01 PROCEDURE — 93005 ELECTROCARDIOGRAM TRACING: CPT

## 2018-01-01 PROCEDURE — G8988 SELF CARE GOAL STATUS: HCPCS

## 2018-01-01 PROCEDURE — 2580000003 HC RX 258: Performed by: PHYSICIAN ASSISTANT

## 2018-01-01 PROCEDURE — 84145 PROCALCITONIN (PCT): CPT

## 2018-01-01 PROCEDURE — 99231 SBSQ HOSP IP/OBS SF/LOW 25: CPT | Performed by: INTERNAL MEDICINE

## 2018-01-01 PROCEDURE — 97535 SELF CARE MNGMENT TRAINING: CPT

## 2018-01-01 PROCEDURE — 82436 ASSAY OF URINE CHLORIDE: CPT

## 2018-01-01 PROCEDURE — 87086 URINE CULTURE/COLONY COUNT: CPT

## 2018-01-01 PROCEDURE — 83735 ASSAY OF MAGNESIUM: CPT

## 2018-01-01 PROCEDURE — 99999 PR OFFICE/OUTPT VISIT,PROCEDURE ONLY: CPT | Performed by: NURSE PRACTITIONER

## 2018-01-01 PROCEDURE — 97161 PT EVAL LOW COMPLEX 20 MIN: CPT

## 2018-01-01 PROCEDURE — 83880 ASSAY OF NATRIURETIC PEPTIDE: CPT

## 2018-01-01 PROCEDURE — 99233 SBSQ HOSP IP/OBS HIGH 50: CPT | Performed by: INTERNAL MEDICINE

## 2018-01-01 PROCEDURE — 83550 IRON BINDING TEST: CPT

## 2018-01-01 RX ORDER — HEPARIN SODIUM 5000 [USP'U]/ML
5000 INJECTION, SOLUTION INTRAVENOUS; SUBCUTANEOUS 2 TIMES DAILY
Status: DISCONTINUED | OUTPATIENT
Start: 2018-01-01 | End: 2018-01-01 | Stop reason: ALTCHOICE

## 2018-01-01 RX ORDER — MAGNESIUM SULFATE IN WATER 40 MG/ML
2 INJECTION, SOLUTION INTRAVENOUS ONCE
Status: COMPLETED | OUTPATIENT
Start: 2018-01-01 | End: 2018-01-01

## 2018-01-01 RX ORDER — HYDRALAZINE HYDROCHLORIDE 20 MG/ML
10 INJECTION INTRAMUSCULAR; INTRAVENOUS ONCE
Status: COMPLETED | OUTPATIENT
Start: 2018-01-01 | End: 2018-01-01

## 2018-01-01 RX ORDER — WARFARIN SODIUM 1 MG/1
1 TABLET ORAL DAILY
Status: DISCONTINUED | OUTPATIENT
Start: 2018-01-01 | End: 2018-01-01 | Stop reason: ALTCHOICE

## 2018-01-01 RX ORDER — SODIUM CHLORIDE 0.9 % (FLUSH) 0.9 %
10 SYRINGE (ML) INJECTION PRN
Status: DISCONTINUED | OUTPATIENT
Start: 2018-01-01 | End: 2018-01-01 | Stop reason: HOSPADM

## 2018-01-01 RX ORDER — IPRATROPIUM BROMIDE AND ALBUTEROL SULFATE 2.5; .5 MG/3ML; MG/3ML
1 SOLUTION RESPIRATORY (INHALATION)
Status: DISCONTINUED | OUTPATIENT
Start: 2018-01-01 | End: 2018-01-01 | Stop reason: HOSPADM

## 2018-01-01 RX ORDER — ASPIRIN 81 MG/1
81 TABLET, CHEWABLE ORAL DAILY
Qty: 30 TABLET | Refills: 3 | Status: SHIPPED | OUTPATIENT
Start: 2018-01-01

## 2018-01-01 RX ORDER — METOPROLOL TARTRATE 50 MG/1
50 TABLET, FILM COATED ORAL 2 TIMES DAILY
Status: DISCONTINUED | OUTPATIENT
Start: 2018-01-01 | End: 2018-01-01 | Stop reason: HOSPADM

## 2018-01-01 RX ORDER — ISOSORBIDE DINITRATE 10 MG/1
5 TABLET ORAL 3 TIMES DAILY
Status: DISCONTINUED | OUTPATIENT
Start: 2018-01-01 | End: 2018-01-01 | Stop reason: HOSPADM

## 2018-01-01 RX ORDER — ONDANSETRON 4 MG/1
4 TABLET, ORALLY DISINTEGRATING ORAL EVERY 6 HOURS PRN
Status: DISCONTINUED | OUTPATIENT
Start: 2018-01-01 | End: 2018-01-01 | Stop reason: HOSPADM

## 2018-01-01 RX ORDER — LEVOFLOXACIN 250 MG/1
250 TABLET ORAL DAILY
Qty: 4 TABLET | Refills: 0 | Status: SHIPPED | OUTPATIENT
Start: 2018-01-01 | End: 2018-01-01 | Stop reason: HOSPADM

## 2018-01-01 RX ORDER — WARFARIN SODIUM 1 MG/1
1 TABLET ORAL
Status: COMPLETED | OUTPATIENT
Start: 2018-01-01 | End: 2018-01-01

## 2018-01-01 RX ORDER — WARFARIN SODIUM 1 MG/1
TABLET ORAL
COMMUNITY

## 2018-01-01 RX ORDER — LISINOPRIL 10 MG/1
10 TABLET ORAL EVERY 12 HOURS
Status: DISCONTINUED | OUTPATIENT
Start: 2018-01-01 | End: 2018-01-01

## 2018-01-01 RX ORDER — FUROSEMIDE 10 MG/ML
20 INJECTION INTRAMUSCULAR; INTRAVENOUS 2 TIMES DAILY
Status: DISCONTINUED | OUTPATIENT
Start: 2018-01-01 | End: 2018-01-01

## 2018-01-01 RX ORDER — SODIUM CHLORIDE 9 MG/ML
INJECTION, SOLUTION INTRAVENOUS CONTINUOUS
Status: DISCONTINUED | OUTPATIENT
Start: 2018-01-01 | End: 2018-01-01 | Stop reason: HOSPADM

## 2018-01-01 RX ORDER — FUROSEMIDE 10 MG/ML
20 INJECTION INTRAMUSCULAR; INTRAVENOUS 3 TIMES DAILY
Status: DISCONTINUED | OUTPATIENT
Start: 2018-01-01 | End: 2018-01-01

## 2018-01-01 RX ORDER — FUROSEMIDE 20 MG/1
20 TABLET ORAL 2 TIMES DAILY
Status: DISCONTINUED | OUTPATIENT
Start: 2018-01-01 | End: 2018-01-01 | Stop reason: HOSPADM

## 2018-01-01 RX ORDER — OYSTER SHELL CALCIUM WITH VITAMIN D 500; 200 MG/1; [IU]/1
0.5 TABLET, FILM COATED ORAL 2 TIMES DAILY
Status: DISCONTINUED | OUTPATIENT
Start: 2018-01-01 | End: 2018-01-01 | Stop reason: HOSPADM

## 2018-01-01 RX ORDER — MORPHINE SULFATE 2 MG/ML
2 INJECTION, SOLUTION INTRAMUSCULAR; INTRAVENOUS
Status: DISPENSED | OUTPATIENT
Start: 2018-01-01 | End: 2018-01-01

## 2018-01-01 RX ORDER — CARVEDILOL 6.25 MG/1
6.25 TABLET ORAL 2 TIMES DAILY WITH MEALS
Status: DISCONTINUED | OUTPATIENT
Start: 2018-01-01 | End: 2018-01-01

## 2018-01-01 RX ORDER — HYDROCODONE BITARTRATE AND ACETAMINOPHEN 5; 325 MG/1; MG/1
1 TABLET ORAL EVERY 6 HOURS PRN
Status: ON HOLD | COMMUNITY
End: 2018-01-01 | Stop reason: HOSPADM

## 2018-01-01 RX ORDER — SODIUM BICARBONATE 650 MG/1
650 TABLET ORAL 2 TIMES DAILY
Status: DISCONTINUED | OUTPATIENT
Start: 2018-01-01 | End: 2018-01-01 | Stop reason: HOSPADM

## 2018-01-01 RX ORDER — WARFARIN SODIUM 1 MG/1
1 TABLET ORAL DAILY
Status: COMPLETED | OUTPATIENT
Start: 2018-01-01 | End: 2018-01-01

## 2018-01-01 RX ORDER — SODIUM BICARBONATE 650 MG/1
1300 TABLET ORAL 2 TIMES DAILY
Status: DISCONTINUED | OUTPATIENT
Start: 2018-01-01 | End: 2018-01-01 | Stop reason: HOSPADM

## 2018-01-01 RX ORDER — POTASSIUM CHLORIDE 20 MEQ/1
40 TABLET, EXTENDED RELEASE ORAL ONCE
Status: COMPLETED | OUTPATIENT
Start: 2018-01-01 | End: 2018-01-01

## 2018-01-01 RX ORDER — HYDRALAZINE HYDROCHLORIDE 25 MG/1
25 TABLET, FILM COATED ORAL 2 TIMES DAILY
Qty: 60 TABLET | Refills: 5 | Status: SHIPPED | OUTPATIENT
Start: 2018-01-01

## 2018-01-01 RX ORDER — CLONIDINE HYDROCHLORIDE 0.1 MG/1
0.1 TABLET ORAL DAILY
Status: DISCONTINUED | OUTPATIENT
Start: 2018-01-01 | End: 2018-01-01 | Stop reason: HOSPADM

## 2018-01-01 RX ORDER — AMLODIPINE BESYLATE 10 MG/1
10 TABLET ORAL DAILY
Qty: 30 TABLET | Refills: 3 | Status: SHIPPED | OUTPATIENT
Start: 2018-01-01 | End: 2018-01-01

## 2018-01-01 RX ORDER — ISOSORBIDE DINITRATE 5 MG/1
5 TABLET ORAL 3 TIMES DAILY
Qty: 90 TABLET | Refills: 3 | Status: SHIPPED | OUTPATIENT
Start: 2018-01-01

## 2018-01-01 RX ORDER — SODIUM CHLORIDE 450 MG/100ML
INJECTION, SOLUTION INTRAVENOUS CONTINUOUS
Status: DISCONTINUED | OUTPATIENT
Start: 2018-01-01 | End: 2018-01-01 | Stop reason: HOSPADM

## 2018-01-01 RX ORDER — SODIUM CHLORIDE 0.9 % (FLUSH) 0.9 %
10 SYRINGE (ML) INJECTION EVERY 12 HOURS SCHEDULED
Status: DISCONTINUED | OUTPATIENT
Start: 2018-01-01 | End: 2018-01-01 | Stop reason: HOSPADM

## 2018-01-01 RX ORDER — ASPIRIN 81 MG/1
81 TABLET, CHEWABLE ORAL DAILY
Status: DISCONTINUED | OUTPATIENT
Start: 2018-01-01 | End: 2018-01-01 | Stop reason: HOSPADM

## 2018-01-01 RX ORDER — ONDANSETRON 2 MG/ML
4 INJECTION INTRAMUSCULAR; INTRAVENOUS EVERY 6 HOURS PRN
Status: DISCONTINUED | OUTPATIENT
Start: 2018-01-01 | End: 2018-01-01 | Stop reason: HOSPADM

## 2018-01-01 RX ORDER — ACETAMINOPHEN 325 MG/1
650 TABLET ORAL EVERY 4 HOURS PRN
Status: DISCONTINUED | OUTPATIENT
Start: 2018-01-01 | End: 2018-01-01 | Stop reason: HOSPADM

## 2018-01-01 RX ORDER — DILTIAZEM HYDROCHLORIDE 5 MG/ML
10 INJECTION INTRAVENOUS ONCE
Status: DISCONTINUED | OUTPATIENT
Start: 2018-01-01 | End: 2018-01-01

## 2018-01-01 RX ORDER — LACTULOSE 10 G/15ML
20 SOLUTION ORAL 2 TIMES DAILY
Status: DISPENSED | OUTPATIENT
Start: 2018-01-01 | End: 2018-01-01

## 2018-01-01 RX ORDER — POTASSIUM CHLORIDE 20MEQ/15ML
40 LIQUID (ML) ORAL PRN
Status: DISCONTINUED | OUTPATIENT
Start: 2018-01-01 | End: 2018-01-01 | Stop reason: HOSPADM

## 2018-01-01 RX ORDER — FUROSEMIDE 20 MG/1
20 TABLET ORAL 2 TIMES DAILY
Qty: 60 TABLET | Refills: 3 | Status: SHIPPED | OUTPATIENT
Start: 2018-01-01

## 2018-01-01 RX ORDER — AMLODIPINE BESYLATE 10 MG/1
10 TABLET ORAL DAILY
Status: DISCONTINUED | OUTPATIENT
Start: 2018-01-01 | End: 2018-01-01 | Stop reason: HOSPADM

## 2018-01-01 RX ORDER — POTASSIUM CHLORIDE 750 MG/1
40 TABLET, FILM COATED, EXTENDED RELEASE ORAL ONCE
Status: COMPLETED | OUTPATIENT
Start: 2018-01-01 | End: 2018-01-01

## 2018-01-01 RX ORDER — ONDANSETRON 2 MG/ML
4 INJECTION INTRAMUSCULAR; INTRAVENOUS EVERY 6 HOURS PRN
Status: DISCONTINUED | OUTPATIENT
Start: 2018-01-01 | End: 2018-01-01 | Stop reason: RX

## 2018-01-01 RX ORDER — METOPROLOL TARTRATE 50 MG/1
50 TABLET, FILM COATED ORAL 2 TIMES DAILY
Qty: 60 TABLET | Refills: 3 | Status: SHIPPED | OUTPATIENT
Start: 2018-01-01

## 2018-01-01 RX ORDER — CLONIDINE HYDROCHLORIDE 0.1 MG/1
0.1 TABLET ORAL DAILY
Status: DISCONTINUED | OUTPATIENT
Start: 2018-01-01 | End: 2018-01-01

## 2018-01-01 RX ORDER — WARFARIN SODIUM 1 MG/1
1 TABLET ORAL ONCE
Status: COMPLETED | OUTPATIENT
Start: 2018-01-01 | End: 2018-01-01

## 2018-01-01 RX ORDER — OYSTER SHELL CALCIUM WITH VITAMIN D 500; 200 MG/1; [IU]/1
2 TABLET, FILM COATED ORAL 2 TIMES DAILY
Status: DISCONTINUED | OUTPATIENT
Start: 2018-01-01 | End: 2018-01-01 | Stop reason: HOSPADM

## 2018-01-01 RX ORDER — SODIUM BICARBONATE 650 MG/1
650 TABLET ORAL DAILY
Qty: 30 TABLET | Refills: 3 | Status: SHIPPED
Start: 2018-01-01 | End: 2019-04-17

## 2018-01-01 RX ORDER — HYDRALAZINE HYDROCHLORIDE 10 MG/1
10 TABLET, FILM COATED ORAL EVERY 12 HOURS SCHEDULED
Qty: 90 TABLET | Refills: 3 | Status: SHIPPED | OUTPATIENT
Start: 2018-01-01 | End: 2018-01-01

## 2018-01-01 RX ORDER — CEFDINIR 300 MG/1
300 CAPSULE ORAL DAILY
Qty: 4 CAPSULE | Refills: 0 | Status: SHIPPED | OUTPATIENT
Start: 2018-01-01 | End: 2018-01-01

## 2018-01-01 RX ORDER — ACETAMINOPHEN 160 MG
2000 TABLET,DISINTEGRATING ORAL 2 TIMES DAILY
COMMUNITY

## 2018-01-01 RX ORDER — HYDROCODONE BITARTRATE AND ACETAMINOPHEN 5; 325 MG/1; MG/1
1 TABLET ORAL EVERY 8 HOURS PRN
COMMUNITY
Start: 2018-01-01 | End: 2018-01-01

## 2018-01-01 RX ORDER — SODIUM CHLORIDE 9 MG/ML
INJECTION, SOLUTION INTRAVENOUS CONTINUOUS
Status: DISCONTINUED | OUTPATIENT
Start: 2018-01-01 | End: 2018-01-01

## 2018-01-01 RX ORDER — CALCIUM CARBONATE-CHOLECALCIFEROL TAB 250 MG-125 UNIT 250-125 MG-UNIT
1 TAB ORAL 2 TIMES DAILY
Qty: 60 TABLET | Refills: 3 | Status: SHIPPED | OUTPATIENT
Start: 2018-01-01 | End: 2019-01-30

## 2018-01-01 RX ORDER — MORPHINE SULFATE 4 MG/ML
4 INJECTION, SOLUTION INTRAMUSCULAR; INTRAVENOUS
Status: ACTIVE | OUTPATIENT
Start: 2018-01-01 | End: 2018-01-01

## 2018-01-01 RX ORDER — PANTOPRAZOLE SODIUM 40 MG/1
40 TABLET, DELAYED RELEASE ORAL
Status: DISCONTINUED | OUTPATIENT
Start: 2018-01-01 | End: 2018-01-01 | Stop reason: HOSPADM

## 2018-01-01 RX ORDER — POTASSIUM CHLORIDE 7.45 MG/ML
10 INJECTION INTRAVENOUS PRN
Status: DISCONTINUED | OUTPATIENT
Start: 2018-01-01 | End: 2018-01-01 | Stop reason: HOSPADM

## 2018-01-01 RX ORDER — HYDRALAZINE HYDROCHLORIDE 10 MG/1
10 TABLET, FILM COATED ORAL EVERY 12 HOURS SCHEDULED
Status: DISCONTINUED | OUTPATIENT
Start: 2018-01-01 | End: 2018-01-01 | Stop reason: HOSPADM

## 2018-01-01 RX ORDER — HYDRALAZINE HYDROCHLORIDE 20 MG/ML
10 INJECTION INTRAMUSCULAR; INTRAVENOUS EVERY 4 HOURS PRN
Status: DISCONTINUED | OUTPATIENT
Start: 2018-01-01 | End: 2018-01-01 | Stop reason: HOSPADM

## 2018-01-01 RX ORDER — POTASSIUM CHLORIDE 20 MEQ/1
40 TABLET, EXTENDED RELEASE ORAL PRN
Status: DISCONTINUED | OUTPATIENT
Start: 2018-01-01 | End: 2018-01-01 | Stop reason: HOSPADM

## 2018-01-01 RX ORDER — SODIUM BICARBONATE 650 MG/1
650 TABLET ORAL 2 TIMES DAILY
Qty: 60 TABLET | Refills: 3 | Status: SHIPPED | OUTPATIENT
Start: 2018-01-01 | End: 2018-01-01 | Stop reason: DRUGHIGH

## 2018-01-01 RX ADMIN — FUROSEMIDE 20 MG: 10 INJECTION, SOLUTION INTRAMUSCULAR; INTRAVENOUS at 08:47

## 2018-01-01 RX ADMIN — VITAMIN D, TAB 1000IU (100/BT) 2000 UNITS: 25 TAB at 20:10

## 2018-01-01 RX ADMIN — CALCIUM CARBONATE-VITAMIN D TAB 500 MG-200 UNIT 0.5 TABLET: 500-200 TAB at 22:06

## 2018-01-01 RX ADMIN — CLONIDINE HYDROCHLORIDE 0.1 MG: 0.1 TABLET ORAL at 09:02

## 2018-01-01 RX ADMIN — SODIUM BICARBONATE 1300 MG: 650 TABLET ORAL at 19:45

## 2018-01-01 RX ADMIN — PANTOPRAZOLE SODIUM 40 MG: 40 TABLET, DELAYED RELEASE ORAL at 04:19

## 2018-01-01 RX ADMIN — HYDRALAZINE HYDROCHLORIDE 10 MG: 10 TABLET, FILM COATED ORAL at 18:06

## 2018-01-01 RX ADMIN — ACETAMINOPHEN 650 MG: 325 TABLET ORAL at 20:48

## 2018-01-01 RX ADMIN — SODIUM BICARBONATE 1300 MG: 650 TABLET ORAL at 08:38

## 2018-01-01 RX ADMIN — ISOSORBIDE DINITRATE 5 MG: 10 TABLET ORAL at 13:14

## 2018-01-01 RX ADMIN — ACETAMINOPHEN 650 MG: 325 TABLET ORAL at 07:38

## 2018-01-01 RX ADMIN — ASPIRIN 81 MG: 81 TABLET, CHEWABLE ORAL at 09:02

## 2018-01-01 RX ADMIN — Medication 10 ML: at 22:07

## 2018-01-01 RX ADMIN — Medication 10 ML: at 23:17

## 2018-01-01 RX ADMIN — VITAMIN D, TAB 1000IU (100/BT) 2000 UNITS: 25 TAB at 22:06

## 2018-01-01 RX ADMIN — SODIUM CHLORIDE: 4.5 INJECTION, SOLUTION INTRAVENOUS at 18:52

## 2018-01-01 RX ADMIN — Medication 10 ML: at 20:07

## 2018-01-01 RX ADMIN — METOPROLOL TARTRATE 50 MG: 50 TABLET, FILM COATED ORAL at 08:48

## 2018-01-01 RX ADMIN — CEFTRIAXONE 1 G: 1 INJECTION, POWDER, FOR SOLUTION INTRAMUSCULAR; INTRAVENOUS at 13:11

## 2018-01-01 RX ADMIN — Medication 10 ML: at 08:47

## 2018-01-01 RX ADMIN — METOPROLOL TARTRATE 50 MG: 50 TABLET, FILM COATED ORAL at 20:06

## 2018-01-01 RX ADMIN — VITAMIN D, TAB 1000IU (100/BT) 2000 UNITS: 25 TAB at 01:24

## 2018-01-01 RX ADMIN — CALCIUM CARBONATE-VITAMIN D TAB 500 MG-200 UNIT 0.5 TABLET: 500-200 TAB at 08:48

## 2018-01-01 RX ADMIN — VITAMIN D, TAB 1000IU (100/BT) 2000 UNITS: 25 TAB at 21:22

## 2018-01-01 RX ADMIN — CALCIUM CARBONATE-VITAMIN D TAB 500 MG-200 UNIT 2 TABLET: 500-200 TAB at 08:38

## 2018-01-01 RX ADMIN — ACETAMINOPHEN 650 MG: 325 TABLET ORAL at 04:19

## 2018-01-01 RX ADMIN — CALCIUM CARBONATE-VITAMIN D TAB 500 MG-200 UNIT 0.5 TABLET: 500-200 TAB at 09:02

## 2018-01-01 RX ADMIN — ACETAMINOPHEN 650 MG: 325 TABLET ORAL at 00:12

## 2018-01-01 RX ADMIN — SODIUM CHLORIDE: 9 INJECTION, SOLUTION INTRAVENOUS at 13:31

## 2018-01-01 RX ADMIN — POTASSIUM CHLORIDE 40 MEQ: 1500 TABLET, EXTENDED RELEASE ORAL at 10:08

## 2018-01-01 RX ADMIN — SODIUM BICARBONATE 650 MG: 650 TABLET ORAL at 09:03

## 2018-01-01 RX ADMIN — VITAMIN D, TAB 1000IU (100/BT) 2000 UNITS: 25 TAB at 20:36

## 2018-01-01 RX ADMIN — CEFTRIAXONE 1 G: 1 INJECTION, POWDER, FOR SOLUTION INTRAMUSCULAR; INTRAVENOUS at 14:34

## 2018-01-01 RX ADMIN — Medication 1.5 MG: at 17:44

## 2018-01-01 RX ADMIN — Medication 1.5 MG: at 18:54

## 2018-01-01 RX ADMIN — FUROSEMIDE 20 MG: 10 INJECTION, SOLUTION INTRAMUSCULAR; INTRAVENOUS at 14:51

## 2018-01-01 RX ADMIN — SODIUM BICARBONATE 1300 MG: 650 TABLET ORAL at 09:26

## 2018-01-01 RX ADMIN — WARFARIN SODIUM 1.5 MG: 1 TABLET ORAL at 16:03

## 2018-01-01 RX ADMIN — FUROSEMIDE 20 MG: 10 INJECTION, SOLUTION INTRAMUSCULAR; INTRAVENOUS at 22:07

## 2018-01-01 RX ADMIN — METOPROLOL TARTRATE 50 MG: 50 TABLET, FILM COATED ORAL at 22:07

## 2018-01-01 RX ADMIN — VITAMIN D, TAB 1000IU (100/BT) 2000 UNITS: 25 TAB at 09:04

## 2018-01-01 RX ADMIN — IPRATROPIUM BROMIDE AND ALBUTEROL SULFATE 1 AMPULE: .5; 3 SOLUTION RESPIRATORY (INHALATION) at 22:35

## 2018-01-01 RX ADMIN — FUROSEMIDE 20 MG: 10 INJECTION, SOLUTION INTRAMUSCULAR; INTRAVENOUS at 09:02

## 2018-01-01 RX ADMIN — SODIUM BICARBONATE 1300 MG: 650 TABLET ORAL at 20:10

## 2018-01-01 RX ADMIN — VITAMIN D, TAB 1000IU (100/BT) 2000 UNITS: 25 TAB at 20:48

## 2018-01-01 RX ADMIN — SODIUM BICARBONATE 650 MG: 650 TABLET ORAL at 20:06

## 2018-01-01 RX ADMIN — AZITHROMYCIN MONOHYDRATE 500 MG: 500 INJECTION, POWDER, LYOPHILIZED, FOR SOLUTION INTRAVENOUS at 22:06

## 2018-01-01 RX ADMIN — METOPROLOL TARTRATE 50 MG: 50 TABLET, FILM COATED ORAL at 09:02

## 2018-01-01 RX ADMIN — SODIUM BICARBONATE 1300 MG: 650 TABLET ORAL at 20:36

## 2018-01-01 RX ADMIN — IPRATROPIUM BROMIDE AND ALBUTEROL SULFATE 1 AMPULE: .5; 3 SOLUTION RESPIRATORY (INHALATION) at 12:07

## 2018-01-01 RX ADMIN — METOPROLOL TARTRATE 50 MG: 50 TABLET, FILM COATED ORAL at 21:25

## 2018-01-01 RX ADMIN — SODIUM CHLORIDE: 9 INJECTION, SOLUTION INTRAVENOUS at 14:35

## 2018-01-01 RX ADMIN — CALCIUM CARBONATE-VITAMIN D TAB 500 MG-200 UNIT 0.5 TABLET: 500-200 TAB at 21:22

## 2018-01-01 RX ADMIN — IPRATROPIUM BROMIDE AND ALBUTEROL SULFATE 1 AMPULE: .5; 3 SOLUTION RESPIRATORY (INHALATION) at 08:23

## 2018-01-01 RX ADMIN — MAGNESIUM SULFATE HEPTAHYDRATE 2 G: 40 INJECTION, SOLUTION INTRAVENOUS at 09:04

## 2018-01-01 RX ADMIN — AZITHROMYCIN MONOHYDRATE 500 MG: 500 INJECTION, POWDER, LYOPHILIZED, FOR SOLUTION INTRAVENOUS at 20:20

## 2018-01-01 RX ADMIN — HYDRALAZINE HYDROCHLORIDE 10 MG: 10 TABLET, FILM COATED ORAL at 20:05

## 2018-01-01 RX ADMIN — CLONIDINE HYDROCHLORIDE 0.1 MG: 0.1 TABLET ORAL at 09:26

## 2018-01-01 RX ADMIN — VITAMIN D, TAB 1000IU (100/BT) 2000 UNITS: 25 TAB at 08:48

## 2018-01-01 RX ADMIN — ISOSORBIDE DINITRATE 5 MG: 10 TABLET ORAL at 08:48

## 2018-01-01 RX ADMIN — Medication 10 ML: at 21:16

## 2018-01-01 RX ADMIN — CALCIUM CARBONATE-VITAMIN D TAB 500 MG-200 UNIT 2 TABLET: 500-200 TAB at 20:10

## 2018-01-01 RX ADMIN — ISOSORBIDE DINITRATE 5 MG: 10 TABLET ORAL at 13:56

## 2018-01-01 RX ADMIN — IPRATROPIUM BROMIDE AND ALBUTEROL SULFATE 1 AMPULE: .5; 3 SOLUTION RESPIRATORY (INHALATION) at 08:02

## 2018-01-01 RX ADMIN — ISOSORBIDE DINITRATE 5 MG: 10 TABLET ORAL at 18:05

## 2018-01-01 RX ADMIN — AMLODIPINE BESYLATE 10 MG: 10 TABLET ORAL at 18:54

## 2018-01-01 RX ADMIN — CALCIUM CARBONATE-VITAMIN D TAB 500 MG-200 UNIT 0.5 TABLET: 500-200 TAB at 07:54

## 2018-01-01 RX ADMIN — IPRATROPIUM BROMIDE AND ALBUTEROL SULFATE 1 AMPULE: .5; 3 SOLUTION RESPIRATORY (INHALATION) at 08:20

## 2018-01-01 RX ADMIN — CHLOROTHIAZIDE SODIUM 500 MG: 500 INJECTION, POWDER, LYOPHILIZED, FOR SOLUTION INTRAVENOUS at 23:17

## 2018-01-01 RX ADMIN — CALCIUM CARBONATE-VITAMIN D TAB 500 MG-200 UNIT 0.5 TABLET: 500-200 TAB at 20:06

## 2018-01-01 RX ADMIN — Medication 10 ML: at 07:50

## 2018-01-01 RX ADMIN — CEFTRIAXONE 1 G: 1 INJECTION, POWDER, FOR SOLUTION INTRAMUSCULAR; INTRAVENOUS at 14:50

## 2018-01-01 RX ADMIN — SODIUM BICARBONATE 1300 MG: 650 TABLET ORAL at 09:20

## 2018-01-01 RX ADMIN — METOPROLOL TARTRATE 50 MG: 50 TABLET, FILM COATED ORAL at 18:54

## 2018-01-01 RX ADMIN — CALCIUM CARBONATE-VITAMIN D TAB 500 MG-200 UNIT 0.5 TABLET: 500-200 TAB at 20:48

## 2018-01-01 RX ADMIN — HYDRALAZINE HYDROCHLORIDE 10 MG: 10 TABLET, FILM COATED ORAL at 08:48

## 2018-01-01 RX ADMIN — SODIUM BICARBONATE 650 MG: 650 TABLET ORAL at 07:54

## 2018-01-01 RX ADMIN — CALCIUM CARBONATE-VITAMIN D TAB 500 MG-200 UNIT 0.5 TABLET: 500-200 TAB at 07:39

## 2018-01-01 RX ADMIN — Medication 0.5 MG: at 18:06

## 2018-01-01 RX ADMIN — POTASSIUM CHLORIDE 40 MEQ: 750 TABLET, FILM COATED, EXTENDED RELEASE ORAL at 12:01

## 2018-01-01 RX ADMIN — IPRATROPIUM BROMIDE AND ALBUTEROL SULFATE 1 AMPULE: .5; 3 SOLUTION RESPIRATORY (INHALATION) at 21:27

## 2018-01-01 RX ADMIN — SODIUM BICARBONATE 650 MG: 650 TABLET ORAL at 20:49

## 2018-01-01 RX ADMIN — MORPHINE SULFATE 2 MG: 2 INJECTION, SOLUTION INTRAMUSCULAR; INTRAVENOUS at 15:58

## 2018-01-01 RX ADMIN — PANTOPRAZOLE SODIUM 40 MG: 40 TABLET, DELAYED RELEASE ORAL at 06:23

## 2018-01-01 RX ADMIN — Medication 10 ML: at 07:57

## 2018-01-01 RX ADMIN — ASPIRIN 81 MG: 81 TABLET, CHEWABLE ORAL at 08:48

## 2018-01-01 RX ADMIN — ASPIRIN 81 MG: 81 TABLET, CHEWABLE ORAL at 22:06

## 2018-01-01 RX ADMIN — Medication 10 ML: at 21:26

## 2018-01-01 RX ADMIN — METOPROLOL TARTRATE 50 MG: 50 TABLET, FILM COATED ORAL at 08:38

## 2018-01-01 RX ADMIN — METOPROLOL TARTRATE 50 MG: 50 TABLET, FILM COATED ORAL at 07:54

## 2018-01-01 RX ADMIN — AZITHROMYCIN MONOHYDRATE 500 MG: 500 INJECTION, POWDER, LYOPHILIZED, FOR SOLUTION INTRAVENOUS at 20:47

## 2018-01-01 RX ADMIN — WARFARIN SODIUM 1 MG: 1 TABLET ORAL at 22:07

## 2018-01-01 RX ADMIN — HYDRALAZINE HYDROCHLORIDE 10 MG: 10 TABLET, FILM COATED ORAL at 07:39

## 2018-01-01 RX ADMIN — LACTULOSE 20 G: 10 SOLUTION ORAL at 14:04

## 2018-01-01 RX ADMIN — CLONIDINE HYDROCHLORIDE 0.1 MG: 0.1 TABLET ORAL at 09:21

## 2018-01-01 RX ADMIN — SODIUM BICARBONATE 650 MG: 650 TABLET ORAL at 22:06

## 2018-01-01 RX ADMIN — AMIODARONE HYDROCHLORIDE 150 MG: 50 INJECTION, SOLUTION INTRAVENOUS at 17:21

## 2018-01-01 RX ADMIN — SODIUM BICARBONATE 650 MG: 650 TABLET ORAL at 21:22

## 2018-01-01 RX ADMIN — AZITHROMYCIN MONOHYDRATE 500 MG: 500 INJECTION, POWDER, LYOPHILIZED, FOR SOLUTION INTRAVENOUS at 21:14

## 2018-01-01 RX ADMIN — IPRATROPIUM BROMIDE AND ALBUTEROL SULFATE 1 AMPULE: .5; 3 SOLUTION RESPIRATORY (INHALATION) at 09:11

## 2018-01-01 RX ADMIN — PANTOPRAZOLE SODIUM 40 MG: 40 TABLET, DELAYED RELEASE ORAL at 06:12

## 2018-01-01 RX ADMIN — FUROSEMIDE 20 MG: 10 INJECTION, SOLUTION INTRAMUSCULAR; INTRAVENOUS at 21:22

## 2018-01-01 RX ADMIN — IPRATROPIUM BROMIDE AND ALBUTEROL SULFATE 1 AMPULE: .5; 3 SOLUTION RESPIRATORY (INHALATION) at 17:47

## 2018-01-01 RX ADMIN — Medication 10 ML: at 09:02

## 2018-01-01 RX ADMIN — WARFARIN SODIUM 1 MG: 1 TABLET ORAL at 17:22

## 2018-01-01 RX ADMIN — ASPIRIN 81 MG: 81 TABLET, CHEWABLE ORAL at 07:54

## 2018-01-01 RX ADMIN — IPRATROPIUM BROMIDE AND ALBUTEROL SULFATE 1 AMPULE: .5; 3 SOLUTION RESPIRATORY (INHALATION) at 20:32

## 2018-01-01 RX ADMIN — IPRATROPIUM BROMIDE AND ALBUTEROL SULFATE 1 AMPULE: .5; 3 SOLUTION RESPIRATORY (INHALATION) at 13:09

## 2018-01-01 RX ADMIN — ISOSORBIDE DINITRATE 5 MG: 10 TABLET ORAL at 20:49

## 2018-01-01 RX ADMIN — Medication 0.5 MG: at 13:56

## 2018-01-01 RX ADMIN — FUROSEMIDE 20 MG: 20 TABLET ORAL at 07:40

## 2018-01-01 RX ADMIN — HYDRALAZINE HYDROCHLORIDE 10 MG: 20 INJECTION INTRAMUSCULAR; INTRAVENOUS at 04:20

## 2018-01-01 RX ADMIN — ASPIRIN 81 MG: 81 TABLET, CHEWABLE ORAL at 07:39

## 2018-01-01 RX ADMIN — CALCIUM GLUCONATE 1 G: 94 INJECTION, SOLUTION INTRAVENOUS at 01:24

## 2018-01-01 RX ADMIN — CEFTRIAXONE 1 G: 1 INJECTION, POWDER, FOR SOLUTION INTRAMUSCULAR; INTRAVENOUS at 13:15

## 2018-01-01 RX ADMIN — VITAMIN D, TAB 1000IU (100/BT) 2000 UNITS: 25 TAB at 07:54

## 2018-01-01 RX ADMIN — SODIUM BICARBONATE 650 MG: 650 TABLET ORAL at 07:39

## 2018-01-01 RX ADMIN — ISOSORBIDE DINITRATE 5 MG: 10 TABLET ORAL at 07:38

## 2018-01-01 RX ADMIN — FUROSEMIDE 20 MG: 10 INJECTION, SOLUTION INTRAMUSCULAR; INTRAVENOUS at 07:56

## 2018-01-01 RX ADMIN — VITAMIN D, TAB 1000IU (100/BT) 2000 UNITS: 25 TAB at 07:39

## 2018-01-01 RX ADMIN — VITAMIN D, TAB 1000IU (100/BT) 2000 UNITS: 25 TAB at 20:06

## 2018-01-01 RX ADMIN — IPRATROPIUM BROMIDE AND ALBUTEROL SULFATE 1 AMPULE: .5; 3 SOLUTION RESPIRATORY (INHALATION) at 00:16

## 2018-01-01 RX ADMIN — ISOSORBIDE DINITRATE 5 MG: 10 TABLET ORAL at 20:06

## 2018-01-01 RX ADMIN — HYDRALAZINE HYDROCHLORIDE 10 MG: 20 INJECTION INTRAMUSCULAR; INTRAVENOUS at 03:37

## 2018-01-01 RX ADMIN — SODIUM BICARBONATE 650 MG: 650 TABLET ORAL at 08:48

## 2018-01-01 RX ADMIN — WARFARIN SODIUM 1 MG: 1 TABLET ORAL at 17:19

## 2018-01-01 RX ADMIN — Medication 10 ML: at 03:38

## 2018-01-01 RX ADMIN — Medication 10 ML: at 20:49

## 2018-01-01 RX ADMIN — METOPROLOL TARTRATE 50 MG: 50 TABLET, FILM COATED ORAL at 20:49

## 2018-01-01 RX ADMIN — METOPROLOL TARTRATE 50 MG: 50 TABLET, FILM COATED ORAL at 07:39

## 2018-01-01 RX ADMIN — IPRATROPIUM BROMIDE AND ALBUTEROL SULFATE 1 AMPULE: .5; 3 SOLUTION RESPIRATORY (INHALATION) at 13:17

## 2018-01-01 RX ADMIN — AMIODARONE HYDROCHLORIDE 1 MG/MIN: 50 INJECTION, SOLUTION INTRAVENOUS at 17:30

## 2018-01-01 RX ADMIN — HYDRALAZINE HYDROCHLORIDE 10 MG: 20 INJECTION INTRAMUSCULAR; INTRAVENOUS at 19:35

## 2018-01-01 RX ADMIN — AMLODIPINE BESYLATE 10 MG: 10 TABLET ORAL at 08:38

## 2018-01-01 RX ADMIN — CLONIDINE HYDROCHLORIDE 0.1 MG: 0.1 TABLET ORAL at 08:38

## 2018-01-01 ASSESSMENT — PAIN SCALES - GENERAL
PAINLEVEL_OUTOF10: 0
PAINLEVEL_OUTOF10: 3
PAINLEVEL_OUTOF10: 3
PAINLEVEL_OUTOF10: 2
PAINLEVEL_OUTOF10: 7
PAINLEVEL_OUTOF10: 2
PAINLEVEL_OUTOF10: 0
PAINLEVEL_OUTOF10: 3
PAINLEVEL_OUTOF10: 3
PAINLEVEL_OUTOF10: 6
PAINLEVEL_OUTOF10: 3
PAINLEVEL_OUTOF10: 5
PAINLEVEL_OUTOF10: 4

## 2018-01-01 ASSESSMENT — PAIN DESCRIPTION - LOCATION
LOCATION: BUTTOCKS
LOCATION: CHEST
LOCATION: GENERALIZED
LOCATION: BACK
LOCATION: HEAD

## 2018-01-01 ASSESSMENT — ENCOUNTER SYMPTOMS
SHORTNESS OF BREATH: 0
VOMITING: 0
SINUS PAIN: 0
SINUS PRESSURE: 0
SORE THROAT: 0
ABDOMINAL PAIN: 0
CHEST TIGHTNESS: 0
NAUSEA: 0
COUGH: 0

## 2018-01-01 ASSESSMENT — PAIN DESCRIPTION - ORIENTATION
ORIENTATION: MID
ORIENTATION: MID

## 2018-01-01 ASSESSMENT — PAIN DESCRIPTION - DESCRIPTORS
DESCRIPTORS: ACHING
DESCRIPTORS: OTHER (COMMENT)
DESCRIPTORS: HEADACHE

## 2018-01-01 ASSESSMENT — PAIN DESCRIPTION - PAIN TYPE
TYPE: ACUTE PAIN
TYPE: CHRONIC PAIN
TYPE: ACUTE PAIN
TYPE: ACUTE PAIN

## 2018-01-01 ASSESSMENT — PAIN DESCRIPTION - FREQUENCY
FREQUENCY: CONTINUOUS
FREQUENCY: OTHER (COMMENT)

## 2018-01-01 ASSESSMENT — PAIN DESCRIPTION - ONSET: ONSET: SUDDEN

## 2018-01-01 ASSESSMENT — PAIN SCALES - WONG BAKER: WONGBAKER_NUMERICALRESPONSE: 2

## 2018-01-09 NOTE — PROGRESS NOTES
[facility]    [facility], [address]   [facility], [address]  [phone]  Progress Note  1/9/2018 10:17 AM      Pt Name:    Audrey Abad  YOB: 1929  Primary Care Physician:  Malachi Ta     Chief Complaint: Elevated serum creatinine     Background Information/Interval History: This is the first time I am seeing her. Pt is 79 yo elderly female with hx CKD, previous TRACI who is a poor historian. She is here for evaluation of elevated serum creatinine. She was asked by her PCP to see a kidney doctor. Review of chart shows she has previously seen Dr. Cynthia Bhatia and more recently last year in April 2017 she saw Dr. Sindy Elizabeth in hospital. Patient says she was scheduled to see Dr. Sindy Elizabeth in Nov 2017 but appt was rescheduled for Dec 2017 and then Jan 2018. She was then scheduled to see Dr. Cynthia Bhatia but she would like to be seen at [facility] and hence she is here to see me. She is an extremely poor historian. She is here alone. Apparently she was dropped off by transportation and here belonging were left in the vehicle. She does not know what medications she is taking. Upon questioning, she denies any DM. She denies any hx cancer. She denies any heart problems. She reports hx HTN and says BP at home is \"not too bad. \" Says it is around 120/55. She does not know what medications. She reports she had \"esophagus problems\" but unable to clarify. She has had partial colon resection few years ago. 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 She follows with her PCP. She is hard-of-hearing. No chest pain or shortness of breath. She reports some diarrhea at home. She denies any recent skin rash. She is a NOT a good historian. She is here alone. I reviewed BMP from Jan 2, 2018 : creat is 4. 5!! She received rocephin IM for UTI as outpatient.       Past History:  Past Medical History:   Diagnosis Date    Chronic kidney disease, stage IV (severe) (HCC)     GERD (gastroesophageal reflux disease)     Gout     Unspecified essential hypertension     Unspecified vitamin D deficiency      Past Surgical History:   Procedure Laterality Date    CATARACT REMOVAL      HIP FRACTURE SURGERY Right     HYSTERECTOMY      KNEE SURGERY Bilateral     arthoscopic        VITALS:  /74 (Site: Left Arm, Position: Sitting, Cuff Size: Medium Adult)   Pulse 80   Wt 88 lb 3.2 oz (40 kg)   SpO2 98%   BMI 17.81 kg/m²   Wt Readings from Last 3 Encounters:   01/09/18 88 lb 3.2 oz (40 kg)   04/09/17 88 lb 13.5 oz (40.3 kg)   03/02/16 102 lb 9.6 oz (46.5 kg)     Body mass index is 17.81 kg/m². General Appearance: alert and cooperative with exam, appears comfortable, no distress  Oral: moist oral mucus membranes  Neck: No jugular venous distention  Lungs: Air entry B/L, no crackles or rales, no use of accessory muscles  Heart: S1, S2 heard  GI: soft, non-tender, no guarding  Extremities: No sig LE edema     Medications:  Current Outpatient Prescriptions   Medication Sig Dispense Refill    metoprolol succinate (TOPROL XL) 25 MG extended release tablet Take 25 mg by mouth daily      warfarin (COUMADIN) 4 MG tablet Take 1 mg (1/4 of one tablet) by mouth once daily from Monday through Friday, no dose on Saturdays or Sundays      HYDROcodone-acetaminophen (NORCO) 5-325 MG per tablet Take 1 tablet by mouth every 6 hours as needed for Pain .  amLODIPine (NORVASC) 5 MG tablet Take 5 mg by mouth 2 times daily      cloNIDine (CATAPRES) 0.1 MG tablet Take 0.1 mg by mouth daily      loperamide (IMODIUM) 1 MG/5ML solution Take 1 mg by mouth 3 times daily as needed for Diarrhea       colchicine 0.6 MG tablet Take 0.6 mg by mouth daily.  omeprazole (PRILOSEC) 20 MG capsule Take 20 mg by mouth daily. No current facility-administered medications for this visit. Laboratory & Diagnostics:  Old labs  Jan 2018: Creat 4.5, bicarb 16     Impression/Plan:   1.  TRACI on CKD IV-V vs progressive CKD V vs subacute TRACI on CKD  - unclear at this time. Her creat was 1.7 in April and Now 4.5. Will need to repeat labs today. Will check UA, urine sodium and urine chloride. Check CBC, US kidneys today. If renal function found to be worse then will need hospitalization. This was reviewed with the patient. 2. Met acidosis: in setting of renal failure and ? ? Diarrhea. Will recheck labs and decider further course of action. 3. HTN: on clonidine, norvasc, Toprol-XL ? ? . Unclear. Patient to call our office with updated medication list.   4. ?DVT: on coumadin  5. Aging  6. Hard-of-hearing    Plan of care reviewed with patient. She agrees to get labs drawn today. Will decide based on labs. She may need hospitalization. She voiced understanding. Limited information available at this time. Orders Placed This Encounter   Procedures    US Renal Complete    Basic Metabolic Panel    Urinalysis    Protein / creatinine ratio, urine    Urinalysis with Microscopic     Return in about 1 week (around 1/16/2018).     Orly Connor MD  [facility]

## 2018-01-10 PROBLEM — Z79.01 LONG TERM CURRENT USE OF ANTICOAGULANT THERAPY: Status: ACTIVE | Noted: 2017-04-04

## 2018-01-10 PROBLEM — N17.9 ACUTE KIDNEY INJURY (HCC): Status: ACTIVE | Noted: 2018-01-01

## 2018-01-10 PROBLEM — E86.0 DEHYDRATION: Status: ACTIVE | Noted: 2017-04-04

## 2018-01-10 PROBLEM — Z85.038 PERSONAL HISTORY OF COLON CANCER: Status: ACTIVE | Noted: 2017-04-04

## 2018-01-10 PROBLEM — M47.26 OSTEOARTHRITIS OF SPINE WITH RADICULOPATHY, LUMBAR REGION: Status: ACTIVE | Noted: 2017-01-01

## 2018-01-10 NOTE — ED NOTES
Spoke with patient's daughter Jessica Kenney on telephone. Updated on patient's condition and plan of care. Questions and concerns addressed.       Abby Rahman RN  01/10/18 [id number]

## 2018-01-10 NOTE — ED NOTES
Patient resting in bed with TV on. Updated on POC. Denies needs. Call light within reach.       Sandra Burgess RN  01/10/18 [id number]

## 2018-01-10 NOTE — ED PROVIDER NOTES
note were completed with a voice recognition program.  Efforts were made to edit the dictations but occasionally words are mis-transcribed.)    The patient was given an opportunity to see the Emergency Attending. The patient voiced understanding that I was a Mid-Level Provider and was in agreement with being seen independently by myself.            [facility], [place]  01/10/18 710 [facility]  01/10/18 3386       [facility], [place]  02/09/18 7160

## 2018-01-10 NOTE — H&P
alone    TOBACCO:   reports that she quit smoking about 5 years ago. She has never used smokeless tobacco.  ETOH:   reports that she does not drink alcohol. Family History:    Not available at this time      :        Problem Relation Age of Onset    Diabetes Father     Heart Attack Father        Diet:       REVIEW OF SYSTEMS:   Pertinent positives as noted in the HPI. All other systems reviewed and negative. PHYSICAL EXAM:    BP (!) 142/51   Pulse 61   Temp 98.3 °F (36.8 °C) (Oral)   Resp 13   Ht 4' 11\" (1.499 m)   Wt 88 lb (39.9 kg)   SpO2 98%   BMI 17.77 kg/m²     General appearance: poor hygiene and care. Confused at times sees ghosts in her house and they talk to her. cooperative. HEENT:  Normal cephalic, atraumatic without obvious deformity. Pupils equal, round, and reactive to light. Extra ocular muscles intact. Conjunctivae/corneas clear. Neck: Supple, with full range of motion. No jugular venous distention. Trachea midline. Respiratory:  Normal respiratory effort. Clear to auscultation, bilaterally without Rales/Wheezes/Rhonchi. Cardiovascular:  Regular rate and rhythm with normal S1/S2 without murmurs, rubs or gallops. Abdomen: Soft, non-tender, non-distended with normal bowel sounds. Musculoskeletal:  No clubbing, cyanosis or edema bilaterally. Full range of motion without deformity. Skin: Skin color, texture, turgor normal.  No rashes or lesions. Neurologic:  Neurovascularly intact without any focal sensory/motor deficits.  Cranial nerves: II-XII intact, grossly non-focal.  Psychiatric:  Alert and oriented, thought content appropriate, normal insight  Capillary Refill: Brisk,< 3 seconds   Peripheral Pulses: +2 palpable, equal bilaterally       Labs:     Recent Labs      01/10/18   1157   WBC  8.0   HGB  9.4*   HCT  29.2*   PLT  158     Recent Labs     01/09/18  01/10/18   1157   NA  142  145   K  3.6  5.0   CL  109  111   CO2  18.0  18*   BUN  32  33*   CREATININE  3.8  3.2*

## 2018-01-11 PROBLEM — E44.0 MODERATE MALNUTRITION (HCC): Chronic | Status: ACTIVE | Noted: 2018-01-01

## 2018-01-11 NOTE — ED NOTES
Patient resting in bed. Denies needs or concerns at this time. Call light in reach, side rails up x2.      Bonnie Duran RN  01/10/18 4708

## 2018-01-11 NOTE — PROGRESS NOTES
Hospitalist Progress Note    Patient:  Andra Riding      Unit/Bed:6K-11/011-A    YOB: 1929    MRN: [id number]       Acct: [de-identified]     PCP: [address]    Date of Admission: 1/10/2018    Chief Complaint: weak    Hospital Course: beginning fluid adjustment and nephrology  On case. Would like to have some peripheral tpn but not sure if fluids present     Subjective: feeling weak       Medications:  Reviewed    Infusion Medications    sodium chloride 75 mL/hr at 01/11/18 1435     Scheduled Medications    warfarin (COUMADIN) daily dosing (placeholder)   Other RX Placeholder    warfarin  1.5 mg Oral Once    lisinopril  10 mg Oral Q12H    vitamin D  2,000 Units Oral Nightly    cloNIDine  0.1 mg Oral Daily    sodium bicarbonate  1,300 mg Oral BID     PRN Meds: acetaminophen, ondansetron      Intake/Output Summary (Last 24 hours) at 01/11/18 1439  Last data filed at 01/11/18 1354   Gross per 24 hour   Intake              930 ml   Output              250 ml   Net              680 ml       Diet:  DIET RENAL;  Dietary Nutrition Supplements: Renal Oral Supplement    Exam:  BP (!) 176/74   Pulse 71   Temp 97.9 °F (36.6 °C) (Oral)   Resp 14   Ht 4' 11\" (1.499 m)   Wt 88 lb 14.4 oz (40.3 kg)   SpO2 97%   BMI 17.96 kg/m²     General appearance  Poor conditionappears stated age and cooperative. HEENT: Pupils equal, round, and reactive to light. Conjunctivae/corneas clear. Neck: Supple, with full range of motion. No jugular venous distention. Trachea midline. Respiratory:  Normal respiratory effort. Clear to auscultation, bilaterally without Rales/Wheezes/Rhonchi. Cardiovascular: Regular rate and rhythm with normal S1/S2 without murmurs, rubs or gallops. Abdomen: Soft, non-tender, non-distended with normal bowel sounds. Musculoskeletal:muscle wasting noted in initial orders. No clubbing, cyanosis or edema bilaterally. Full range of motion without deformity.   Skin: Skin color, texture, turgor normal.  No rashes or lesions. Neurologic:  Neurovascularly intact without any focal sensory/motor deficits. Cranial nerves: II-XII intact, grossly non-focal.  Psychiatric:she tells me she wears a cross about her neck to diaz off ghosts in brittany house! Capillary Refill: Brisk,< 3 seconds   Peripheral Pulses: +2 palpable, equal bilaterally       Labs:   Recent Labs      01/10/18   1157  01/11/18   0517   WBC  8.0  5.7   HGB  9.4*  8.9*   HCT  29.2*  28.7*   PLT  158  148     Recent Labs     01/09/18  01/10/18   1157  01/11/18   0517   NA  142  145  145   K  3.6  5.0  4.1   CL  109  111  112*   CO2  18.0  18*  18*   BUN  32  33*  28*   CREATININE  3.8  3.2*  2.5*   CALCIUM  6.8  7.1*  7.5*     Recent Labs      01/10/18   1157   AST  23   ALT  20   BILIDIR  <0.2   BILITOT  0.2*   ALKPHOS  111     Recent Labs      01/10/18   1249  01/11/18   0517   INR  1.43*  1.31*     No results for input(s): CKTOTAL, TROPONINI in the last 72 hours. Urinalysis:    Lab Results   Component Value Date    NITRU POSITIVE 01/10/2018    WBCUA 50-75 01/10/2018    BACTERIA FEW 01/10/2018    RBCUA 15-20 01/10/2018    BLOODU LARGE 01/10/2018    GLUCOSEU NEGATIVE 01/10/2018       Radiology:  No orders to display       Diet: DIET RENAL;  Dietary Nutrition Supplements: Renal Oral Supplement    DVT prophylaxis: [] Lovenox                                 [] SCDs                                 [] SQ Heparin                                 [] Encourage ambulation           [] Already on Anticoagulation     Disposition:    [] Home       [] TCU       [] Rehab       [] Psych       [] SNF       [] [place]       [] Other-    Code Status: Prior    PT/OT Eval Status: yes    Assessment/Plan:  Hypocalcemia. We will check parathyroid hormone levels. We will check  ionized calcium. Her corrected calcium is still low. We will also check  vitamin D levels and start her on calcium replacement. 3.  Metabolic acidosis.   We will obtain a venous pH and at this point,  am  going to start her on oral bicarbonate therapy. I suspect metabolic  acidosis secondary to acute kidney injury. 4.  History of hypertension. Continue oral medications and adjust as  needed. Avoid any ACE inhibitors at this time. 5.  Anemia. We will send iron studies. We will also send stool for occult  blood. 6.  Probable UTI. She has already been treated with IV Rocephin in the  emergency room. We will continue with IV Rocephin. Follow her urine  culture.   Anticipated Discharge in : [facility] Problems    Diagnosis Date Noted    [facility] [E44.0] 01/11/2018    Acute kidney injury ([facility] [N17.9] 01/10/2018    Other acute kidney failure (CODE) ([facility] [G37.9]     Metabolic acidosis [Z49.2]     Hypocalcemia [E83.51]        1. stable        Electronically signed by Cecy Webster DO on 1/11/2018 at 2:39 PM

## 2018-01-11 NOTE — ED NOTES
Patient ambulated to bathroom with contact guard assistance.       Pascual Martinez RN  01/10/18 2204

## 2018-01-11 NOTE — CONSULTS
[address] [place] [address]                                   CONSULTATION    PATIENT NAME: Rodri Weiss                  :        1929  MED REC NO:   [id number]                           ROOM:       0011  ACCOUNT NO:   [de-identified]                           ADMIT DATE: 01/10/2018  PROVIDER:     AFUA Li:  01/10/2018    Nephrology is seeing this patient for acute kidney injury. HISTORY OF PRESENT ILLNESS:  This is an 68-year-old elderly female with  history of chronic kidney disease, unknown recent baseline creatinine  although in the past it had fluctuated widely, history of chronic  arthritis, previous nonsteroidal use, cachexia/weight loss, history of  partial colon resection, history of what is reported to us as DVT who is on  chronic Coumadin. The patient was seen by me for first time yesterday as a  new patient in my office. Apparently, she was dropped off by her  transportation and she did not have any of her belongings. She did not  know what medication she was taking. Previously, she has seen Dr. Pop Fonseca,  but requested to see me because she did not want to go to [facility]. She has  also previously seen Dr. Thomas Arteaga, but recently she has had some problems  scheduling appointments with his office and therefore she wanted to switch  her care to [facility]. I saw her for the first  time yesterday in my office and requested her to call me with updated  medications. She did not. She was noted to have elevated serum creatinine  on blood work. Apparently, on blood work obtained by her primary care  doctor she was noted to have a serum creatinine of 4.5 with a  bicarbonate of 16. She was then referred to me for further evaluation.   I  repeated her blood work yesterday and it showed that her serum creatinine  was still elevated at 3.8 with a bicarb of 18 and a serum yesterday. Repeat blood  work showed potassium level of 3.6 with a calcium level of 6.8 and bicarb  of 18 with a creatinine of 3.8. On account of this, she was asked to come  to the emergency room. On repeat blood work, her serum creatinine is 3.2. ASSESSMENT AND PLAN:  1. Acute kidney injury superimposed on chronic kidney disease. Her  baseline creatinine is not clear at this time. She does have acute kidney  injury nonetheless. At this point, I suspect prerenal etiology as the  cause of her acute kidney injury. Likely volume depletion. She appears to  have underlying urinary tract infection. However, I do not suspect  pyelonephritis at this time. She does not report any back pain. On my  exam, she did not have any flank tenderness either. At this point, we will  continue with IV fluid hydration. I am going to send some urine  electrolytes. She does have some anemia and previously her platelet counts  have been somewhat on the lower side. Although, on this admission, her  platelet count is reasonably stable at 158. Previously, it was 120. Given  her age, anemia and previous mild thrombocytopenia, I am going to send  basic workup including serum protein electrophoresis and immunofixation  electrophoresis. We will continue with IV fluid hydration. Her ultrasound  shows bilaterally small kidneys without any evidence of hydronephrosis. We  will trend her serum creatinine. 2.  Hypocalcemia. We will check parathyroid hormone levels. We will check  ionized calcium. Her corrected calcium is still low. We will also check  vitamin D levels and start her on calcium replacement. 3.  Metabolic acidosis. We will obtain a venous pH and at this point, I am  going to start her on oral bicarbonate therapy. I suspect metabolic  acidosis secondary to acute kidney injury. 4.  History of hypertension. Continue oral medications and adjust as  needed. Avoid any ACE inhibitors at this time. 5.  Anemia.

## 2018-01-11 NOTE — PROGRESS NOTES
[facility]   Nephrology progress note  1/11/2018, 4:15 PM      Pt Name:    Aniya Flores  MRN:     [id number]     [place]:    2/13/1929  Admit Date:    1/10/2018 10:56 AM  Primary Care Physician:  Kena Culver   Room number  6K-11/011-A    Chief Complaint: Nephrology following for TRACI/CKD    Subjective:  Patient seen and examined  No chest pain or shortness of breath  Feels okay    Objective:  24HR INTAKE/OUTPUT:    Intake/Output Summary (Last 24 hours) at 01/11/18 1615  Last data filed at 01/11/18 1354   Gross per 24 hour   Intake              930 ml   Output              250 ml   Net              680 ml     I/O last 3 completed shifts: In: 80 [P.O.:660; I.V.:270]  Out: 250 [Urine:250]  No intake/output data recorded. Admission weight: 88 lb (39.9 kg)  Wt Readings from Last 3 Encounters:   01/11/18 88 lb 14.4 oz (40.3 kg)   01/09/18 88 lb 3.2 oz (40 kg)   04/09/17 88 lb 13.5 oz (40.3 kg)     Body mass index is 17.96 kg/m².     Physical examination  VITALS:     Vitals:    01/11/18 1517   BP: (!) 113/58   Pulse: 70   Resp: 18   Temp: 97.9 °F (36.6 °C)   SpO2: 95%     General Appearance: alert and cooperative with exam, appears comfortable, no distress  Mouth/Throat: Oral mucosa moist  Neck: No JVD  Lungs: Air entry B/L, no rales, no use of accessory muscles  Heart:  S1, S2 heard  GI: soft, non-tender, no guarding  Extremities: no LE edema      Lab Data  CBC:   Recent Labs      01/10/18   1157  01/11/18   0517   WBC  8.0  5.7   HGB  9.4*  8.9*   HCT  29.2*  28.7*   PLT  158  148     BMP:  Recent Labs     01/09/18  01/10/18   1157  01/11/18   0517   NA  142  145  145   K  3.6  5.0  4.1   CL  109  111  112*   CO2  18.0  18*  18*   BUN  32  33*  28*   CREATININE  3.8  3.2*  2.5*   GLUCOSE  83  86  79   CALCIUM  6.8  7.1*  7.5*     Hepatic: Recent Labs      01/10/18   1157   LABALBU  3.2*   AST  23   ALT  20   BILITOT  0.2*   ALKPHOS  111         Meds:  Infusion:    sodium chloride 75

## 2018-01-11 NOTE — ED NOTES
Upon first contact, pt resting comfortably with no concerns voiced at this time. Respirations are even and unlabored. Side rails up x2 and call light in reach.        Sofia Aggarwal RN  01/10/18 4099

## 2018-01-11 NOTE — PLAN OF CARE
Problem: Nutrition  Goal: Optimal nutrition therapy  Outcome: Ongoing  Nutrition Problem: Moderate malnutrition, in context of chronic illness  Intervention: Food and/or Nutrient Delivery: Continue current diet, Start ONS (Offered diet texture modification to assist with swallowing, pt. declined need.)  Nutritional Goals: Pt will safely consume 75% or more of meals during LOS.

## 2018-01-12 NOTE — PLAN OF CARE
Problem: Risk for Impaired Skin Integrity  Goal: Tissue integrity - skin and mucous membranes  Structural intactness and normal physiological function of skin and  mucous membranes. Outcome: Ongoing  Patient has multiple areas of bruising present from falling.      Problem: Falls - Risk of  Goal: Absence of falls  Outcome: Ongoing  Falling star on door and armband on patient reinforced using call light bed alarm on     Problem: Nutrition  Goal: Optimal nutrition therapy  Outcome: Ongoing  appetite good     Problem: Discharge Planning:  Goal: Participates in care planning  Participates in care planning   Outcome: Ongoing  Patient plans to return home denies needs at this time    Problem: Skin Integrity - Impaired:  Goal: Will show no infection signs and symptoms  Will show no infection signs and symptoms   Outcome: Ongoing  No signs of infection present

## 2018-01-12 NOTE — PROGRESS NOTES
Hospitalist Progress Note    Patient:  Mateo Duarte      Unit/Bed:6K-11/011-A    YOB: 1929    MRN: [id number]       Acct: [de-identified]     PCP: Tony Grijalva    Date of Admission: 1/10/2018    Chief Complaint: clearing mentation    Hospital Course: admit with ckd and sami  The sami seems to be resolving. no fever. Pt needs placement. No new c.c. Subjective: wants to get up and maybe go home        Medications:  Reviewed    Infusion Medications    sodium chloride 75 mL/hr at 01/11/18 1435     Scheduled Medications    warfarin  1.5 mg Oral Once    metoprolol tartrate  25 mg Oral BID    warfarin (COUMADIN) daily dosing (placeholder)   Other RX Placeholder    vitamin D  2,000 Units Oral Nightly    cloNIDine  0.1 mg Oral Daily    sodium bicarbonate  1,300 mg Oral BID     PRN Meds: acetaminophen, ondansetron      Intake/Output Summary (Last 24 hours) at 01/12/18 1632  Last data filed at 01/12/18 1353   Gross per 24 hour   Intake          2436.82 ml   Output             1100 ml   Net          1336.82 ml       Diet:  DIET RENAL;  Dietary Nutrition Supplements: Renal Oral Supplement    Exam:  BP (!) 198/84   Pulse 65   Temp 97.4 °F (36.3 °C) (Oral)   Resp 18   Ht 4' 11\" (1.499 m)   Wt 90 lb 11.2 oz (41.1 kg)   SpO2 98%   BMI 18.32 kg/m²     General appearance: No apparent distress, appears stated age and cooperative. HEENT: Pupils equal, round, and reactive to light. Conjunctivae/corneas clear. Neck: Supple, with full range of motion. No jugular venous distention. Trachea midline. Respiratory:  Normal respiratory effort. Clear to auscultation, bilaterally without Rales/Wheezes/Rhonchi. Cardiovascular: Regular rate and rhythm with normal S1/S2 without murmurs, rubs or gallops. Abdomen: Soft, non-tender, non-distended with normal bowel sounds. Musculoskeletal: No clubbing, cyanosis or edema bilaterally. Full range of motion without deformity.   Skin: Skin color, texture, turgor

## 2018-01-12 NOTE — PROGRESS NOTES
bedside commode  Toilet Transfer: Stand by assistance    Balance  Sitting Balance: Modified independent   Standing Balance: Stand by assistance     Time: x3 minutes, x4 minutes   Activity: Sponge bath at sink      Functional Mobility  Functional - Mobility Device: Rolling Walker  Activity: To/from bathroom  Assist Level: Contact guard assistance     Activity Tolerance:  Activity Tolerance: Patient Tolerated treatment well    Treatment Initiated:  Pt completed sponge bath, dressing and grooming tasks with SBA. Completed while sitting and standing at sink with no cues needed for safety. Pt tolerated standing 3 min and 4 min during ADL tasks with SBA, no LOB and 1-2  UE release during BADLs. Tolerated well with no fatigue. Assessment:  Assessment: Pt presents with slight deconditioning during hospital stay, UB weakness, decreased balance and would benefit from additional home health therapy upon return home. Performance deficits / Impairments: Decreased strength, Decreased high-level IADLs, Decreased balance, Decreased endurance  Prognosis: Good  Discharge Recommendations: Home with Home health OT    Clinical Decision Making: Clinical Decision making was of Low Complexity as the result of analysis of data from a problem focused assessment, a consideration of a limited number of treatment options, no significant comorbidities affecting the plan of care and no modification or assistance required to complete the evaluation. Patient Education:       Equipment Recommendations:  Equipment Needed: No  Other: Pt has shower chair and BSC    Safety:  Safety Devices in place: Yes  Type of devices:  All fall risk precautions in place, Left in chair, Nurse notified, Call light within reach, Gait belt, Patient at risk for falls, Chair alarm in place    Plan:  Times per week: 3-5x   Current Treatment Recommendations: Strengthening, Patient/Caregiver Education & Training, Equipment Evaluation, Education, & procurement, Safety Education & Training, Self-Care / ADL, Endurance Training    Goals:  Patient goals : To return home     Short term goals  Time Frame for Short term goals: 2 weeks   Short term goal 1: Pt will tolerate >4 minutes standnig activity, while reaching outside base of support 1-2 inches to ipmrove balance for meal prep. Short term goal 2: Pt will use walker to prepare light meal with min vcs for safety and SBA for ambulation to improve indep with IADLs within home. Short term goal 3: Pt will complete light strengthening exs x10 reps for improve UB strength and endurance during ADLs. Long term goals  Time Frame for Long term goals : No LTG due to short ELOS     Evaluation Complexity: Based on the findings of patient history, examination, clinical presentation, and decision making during this evaluation, this patient is of low complexity.

## 2018-01-12 NOTE — PROGRESS NOTES
[address] [address]  INPATIENT PHYSICAL THERAPY  EVALUATION  [facility] RENAL TELEMETRY 6K    Time In: 9378  Time Out: 4178  Timed Code Treatment Minutes: 10 Minutes  Minutes: 25        Date: 2018  Patient Name: Amie Kendrick,  Gender:  female        MRN: [id number]  : 1929  (80 y.o.)      Referring Practitioner: Michelle Christy MD  Diagnosis: Acute kidney injury  Additional Pertinent Hx: Amie Kendrick is a 80 y.o. female who presents to the Emergency Department for the evaluation of Kidney disease. The patient is a new patient to Dr. Tyron Tolliver.  He seen her in the office yesterday and ordered laboratory studies that were performed at [facility].  The patient reportedly has an increase in her BUN and creatinine. She also has a urinary tract infection and needs admission to the hospital.  Dr. Tyron Tolliver had attempted to direct admit the patient to the hospital but there are no beds available at this point. Because of this, the patient was sent to the emergency department for evaluation and treatment until an inpatient bed is available. Past Medical History:   Diagnosis Date    [facility] IV [facility]severe) ([facility])     GERD (gastroesophageal reflux disease)     Gout     Unspecified essential hypertension     Unspecified vitamin D deficiency      Past Surgical History:   Procedure Laterality Date    CATARACT REMOVAL      HIP FRACTURE SURGERY Right     HYSTERECTOMY      KNEE SURGERY Bilateral     arthoscopic       Restrictions/Precautions:  Restrictions/Precautions: General Precautions, Fall Risk    Subjective:  Chart Reviewed: Yes  Patient assessed for rehabilitation services?: Yes  Family / Caregiver Present: No  Subjective: Pt is supine in bed upon arrival to room, very pleasant and agreeable to PT.     General:  Overall Orientation Status: Within Functional Limits  Follows Commands: Within Functional Limits  Vision: Impaired  Vision Exceptions: Wears glasses at all times  Hearing: Within functional limits     Pain:  Denies (Pt reports R knee stiffness with WB'ing from fall). Social/Functional History:    Lives With: Alone  Type of Home: House  Home Layout: One level  Home Access: Stairs to enter without rails  Entrance Stairs - Number of Steps: 2- uses door frame B'ly for support  Home Equipment: Rolling walker, 4 wheeled walker   Ambulation Assistance: Independent  Transfer Assistance: Independent  Active : No  Additional Comments: Pt amb with rollator, julius checks in on pt every morning, strong family support    Objective:  RLE PROM: WFL     LLE PROM: WFL    B LE's 4/5     RLE Tone: Normotonic  LLE Tone: Normotonic     Balance  Sitting - Static: Good  Sitting - Dynamic: Good  Standing - Static: Good, -  Standing - Dynamic: Fair, +    Supine to Sit: Stand by assistance (HOB elevated 45 degrees)  Sit to Supine: Stand by assistance (HOB elevated 45 degrees)    Transfers  Sit to Stand: Contact guard assistance (Verbal cues for hand placement)  Stand to sit: Stand by assistance     Ambulation 1  Surface: level tile  Device: Rolling Walker  Assistance: Contact guard assistance  Quality of Gait: Pt amb with decreased missy and stride length, c/o R knee stiffness, non-antalgic pattern, steady without LOB. Distance: 150 feet  Comments: Mild SOB after gait    Exercises:  Comments: Pt performs supine B LE AROM: ankle pumps, heel slides, hip abd/add x 10 reps to increase strength for improved gait and balance. Activity Tolerance:  Activity Tolerance: Patient Tolerated treatment well;Patient limited by endurance    Treatment Initiated: See above exercises. Assessment: Body structures, Functions, Activity limitations: Decreased functional mobility , Decreased endurance, Decreased balance, Decreased strength  Assessment: Pt admitted due to TRACI. Pt tolerates session well, limited by weakness and impaired endurance with mild SOB after gait training.   Pt is

## 2018-01-12 NOTE — PROGRESS NOTES
Clinical Pharmacy Note    Warfarin consult follow-up    Recent Labs      01/12/18   0650   INR  1.15*     Recent Labs      01/10/18   1157  01/11/18   0517  01/12/18   0650   HGB  9.4*  8.9*  9.0*   HCT  29.2*  28.7*  27.6*   PLT  158  148  144       Significant Drug-Drug Interactions:  New warfarin drug-drug interactions: none  Discontinued drug-drug interactions: none  Current warfarin drug-drug interactions: none     Date INR Warfarin Dose   1/10/18 1.43 -----   1/11/18 1.31 1.5 mg   1/12/18 1.15 1.5 mg                                       Notes:                     Daily PT/INR until stable within therapeutic range.

## 2018-01-13 NOTE — PROGRESS NOTES
167/72 (!) 172/72 (!) 171/72   Pulse: 65 62 67 65   Resp: 18 16 18 18   Temp: 97.4 °F (36.3 °C) 98.7 °F (37.1 °C) 98.2 °F (36.8 °C) 98.2 °F (36.8 °C)   TempSrc: Oral Oral Oral Oral   SpO2: 98% 95% 96% 94%   Weight:   93 lb (42.2 kg)    Height:         Weight: Weight: 93 lb (42.2 kg)     24 hour intake/output:  Intake/Output Summary (Last 24 hours) at 01/13/18 1124  Last data filed at 01/13/18 1030   Gross per 24 hour   Intake          2023.72 ml   Output             1200 ml   Net           823.72 ml         General appearance:  No apparent distress, appears stated age and cooperative. HEENT:  Normal cephalic, atraumatic without obvious deformity. Pupils equal, round, and reactive to light. Extra ocular muscles intact. Conjunctivae/corneas clear. Neck: Supple, with full range of motion. No jugular venous distention. Trachea midline. Respiratory:  Normal respiratory effort. Clear to auscultation, bilaterally without Rales/Wheezes/Rhonchi. Cardiovascular:  Regular rate and rhythm with normal S1/S2 without murmurs, rubs or gallops. Abdomen: Soft, non-tender, non-distended with normal bowel sounds. Musculoskeletal:  No clubbing, cyanosis or edema bilaterally. Full range of motion without deformity. Skin: Skin color, texture, turgor normal.  No rashes or lesions. Neurologic:  Neurovascularly intact without any focal sensory/motor deficits. Cranial nerves: II-XII intact, grossly non-focal.  Psychiatric:  Alert and oriented, thought content appropriate, normal insight  Capillary Refill: Brisk,< 3 seconds   Peripheral Pulses: +2 palpable, equal bilaterally       Labs:  For convenience and continuity at follow-up the following most recent labs are provided:      CBC:    Lab Results   Component Value Date    WBC 8.0 01/12/2018    HGB 9.0 01/12/2018    HCT 27.6 01/12/2018     01/12/2018       Renal:  Lab Results   Component Value Date     01/13/2018    K 3.5 01/13/2018     01/13/2018    CO2 20 01/13/2018    BUN 24 01/13/2018    CREATININE 1.6 01/13/2018    CALCIUM 7.6 01/13/2018    PHOS 2.6 04/08/2017         Significant Diagnostic Studies    Radiology:   No orders to display          Consults:     IP CONSULT TO HOSPITALIST  IP CONSULT TO NEPHROLOGY  IP CONSULT TO PHARMACY  IP CONSULT TO DIETITIAN    Disposition:    [] Home       [] TCU       [] Rehab       [] Psych       [] SNF       [] [place]       [] Other-    Condition at Discharge: Stable    Code Status:  Prior      Patient Instructions:    Discharge lab work: pending renal function  Activity: activity as tolerated and no driving for today  Diet: DIET RENAL;  Dietary Nutrition Supplements: Renal Oral Supplement      Follow-up visits:   Lacy Resendiz  [address] [place] [address]  [phone]               Discharge Medications:      Rudy Webber   [place] Medication Instructions NLD:[id number]    Printed on:01/13/18 1124   Medication Information                      Cholecalciferol (VITAMIN D3) 2000 units CAPS  Take 2,000 Units by mouth 2 times daily              cloNIDine (CATAPRES) 0.1 MG tablet  Take 0.1 mg by mouth daily             HYDROcodone-acetaminophen (NORCO) 5-325 MG per tablet  Take 1 tablet by mouth every 6 hours as needed for Pain. omeprazole (PRILOSEC) 20 MG capsule  Take 20 mg by mouth daily. warfarin (COUMADIN) 1 MG tablet  Take 1 mg by mouth Patient takes 1 tablet by mouth on Tue, [address], Sat, Sun, and no warfarin on Mon, Wed, Fri                 Time Spent on discharge is more than 20 minutes in the examination, evaluation, counseling and review of medications and discharge plan. Signed: Thank you Elver Rodas for the opportunity to be involved in this patient's care.     Electronically signed by Nadeen Fox DO on 1/13/2018 at 11:24 AM

## 2018-01-13 NOTE — PROGRESS NOTES
[facility]   Nephrology progress note  1/13/2018, 3:20 PM      Pt Name:    Claire Rivera  MRN:     [id number]     [facility]    2/13/1929  Admit Date:    1/10/2018 10:56 AM  Primary Care Physician:  Leesa Bonilla   Room number  6K-11/011-A    Chief Complaint: Nephrology following for TRACI/CKD    Subjective:  Patient seen and examined  The patient was relaxing in bed. She feels improved and back to normal. She desires to go   Home and feels well enough for discharge. She denies N/V/C/D/SOB/CP. Objective:  24HR INTAKE/OUTPUT:      Intake/Output Summary (Last 24 hours) at 01/13/18 1520  Last data filed at 01/13/18 1325   Gross per 24 hour   Intake             1469 ml   Output             1000 ml   Net              469 ml     I/O last 3 completed shifts: In: 8560 [P.O.:680; I.V.:789]  Out: 1000 [Urine:1000]  No intake/output data recorded. Admission weight: 88 lb (39.9 kg)  Wt Readings from Last 3 Encounters:   01/13/18 93 lb (42.2 kg)   01/09/18 88 lb 3.2 oz (40 kg)   04/09/17 88 lb 13.5 oz (40.3 kg)     Body mass index is 18.78 kg/m².     Physical examination  VITALS:     Vitals:    01/13/18 1127   BP: (!) 168/67   Pulse: 61   Resp: 16   Temp: 98.4 °F (36.9 °C)   SpO2: 97%     General Appearance: alert and cooperative with exam, appears comfortable, no distress  Mouth/Throat: Oral mucosa moist  Neck: No JVD  Lungs: Air entry B/L, no rales, no use of accessory muscles  Heart:  S1, S2 heard  GI: soft, non-tender, no guarding  Extremities: no LE edema      Lab Data  CBC:   Recent Labs      01/11/18   0517  01/12/18   0650   WBC  5.7  8.0   HGB  8.9*  9.0*   HCT  28.7*  27.6*   PLT  148  144     BMP:  Recent Labs      01/11/18   0517  01/12/18   0650  01/13/18   0540   NA  145  146*  143   K  4.1  3.8  3.5   CL  112*  115*  111   CO2  18*  19*  20*   BUN  28*  26*  24*   CREATININE  2.5*  1.9*  1.6*   GLUCOSE  79  86  88   CALCIUM  7.5*  7.2*  7.6*     Hepatic:   Recent Labs      01/12/18

## 2018-01-13 NOTE — PROGRESS NOTES
Discharge teaching and instructions for diagnosis/procedure of TRACI completed with patient using teachback method. AVS reviewed. Printed prescriptions given to patient. Patient voiced understanding regarding prescriptions, follow up appointments, and care of self at home.  Discharged ambulatory to  home with support per family

## 2018-01-13 NOTE — PLAN OF CARE
Problem: Risk for Impaired Skin Integrity  Goal: Tissue integrity - skin and mucous membranes  Structural intactness and normal physiological function of skin and  mucous membranes. Outcome: Ongoing  No new skin breakdown noted. Encouraged patient to reposition in bed.          Problem: Falls - Risk of  Goal: Absence of falls  Outcome: Ongoing  No falls noted this shift. Continue falling star program. Bed alarm on, bed in low position. Call light and personal belongings in reach. Patient uses call light appropriately.        Problem: Nutrition  Goal: Optimal nutrition therapy  Outcome: Ongoing  Patient on renal diet. Tolerating well. Will continue to monitor.         Problem: Discharge Planning:  Goal: Participates in care planning  Participates in care planning  Outcome: Ongoing  Care plan reviewed. White board updated.      Goal: Discharged to appropriate level of care  Discharged to appropriate level of care  Outcome: Ongoing  No discharge orders in place at this time. Patient plans to discharge to home when medically stable.        Problem: Skin Integrity - Impaired:  Goal: Will show no infection signs and symptoms  Will show no infection signs and symptoms  Outcome: Ongoing  Patient afebrile. Receiving antibiotics. Continue to monitor labs.       Comments: Care plan reviewed with patient.   Patient verbalize understanding of the plan of care and contribute to goal setting.

## 2018-01-30 NOTE — PROGRESS NOTES
non-tender, no guarding  Extremities: trace LE edema     Medications:  Current Outpatient Prescriptions   Medication Sig Dispense Refill    metoprolol tartrate (LOPRESSOR) 50 MG tablet Take 1 tablet by mouth 2 times daily 60 tablet 3    amLODIPine (NORVASC) 10 MG tablet Take 1 tablet by mouth daily 30 tablet 3    Cholecalciferol (VITAMIN D3) 2000 units CAPS Take 2,000 Units by mouth 2 times daily       cloNIDine (CATAPRES) 0.1 MG tablet Take 0.1 mg by mouth daily      omeprazole (PRILOSEC) 20 MG capsule Take 20 mg by mouth daily.  warfarin (COUMADIN) 1 MG tablet Take 1 mg by mouth Patient takes 1 tablet by mouth on Tue, Thur, Sat, Sun, and no warfarin on Mon, Wed, Fri       No current facility-administered medications for this visit. Laboratory & Diagnostics:  Old labs  US Jan 2018: R 7.58, L 8.23  K 4.8, Creat 2.5, bicarb 19  MOHAMUD: (-)     Impression/Plan:   1. CKd IV: US: no hydronephrosis, B/L small kidneys. Her creatinine has been high for over a year. Even back in 2017 (April 2017) creat was 3.7. This is all chronic. She has echogenic and small kidneys. At this point process is irreversible. She comes alone. Very hard-of-hearing 79 yo elderly pleasant female. Very thin. No indication for renal biopsy--all chronic process here. Advised her to avoid dehydration. No NSAIDs (no advil, alevel, motrin or ibuprofen). Has underlying CKD IV.   -UA: +100 protein, UPCR 560 mg/g, MOHAMUD: (-)     2. Mild met acidosis: continue with oral bicarbonate 650 mg po BID. 3. Anemia in CKD: will check CBC 1 month. Send her to pre-ESRD program with Evelyn Ferrera CNP  4. Hypocalcemia: Vit D levels okay, calcium improving, continue with oscal 500 mg po BID. 5. HTN: continue with lopressor. Stop Norvasc due to LE edema. Call me in 1 week with some readings. 6. Skin rash: she will see her PCP for this. Unclear reason. She says it is norvasc. Norvasc is being stopped anyway due to LE edema.      She will call me in 1-2

## 2018-03-22 PROBLEM — J96.90 RESPIRATORY FAILURE (HCC): Status: ACTIVE | Noted: 2018-01-01

## 2018-03-22 PROBLEM — J18.9 CAP (COMMUNITY ACQUIRED PNEUMONIA): Status: ACTIVE | Noted: 2018-01-01

## 2018-03-22 NOTE — PROGRESS NOTES
Clinical Pharmacy Note    Breanne England is a 80 y.o. female for whom pharmacy has been asked to manage warfarin therapy. Reason for Admission: shortness of breath, cough    Consulting Physician: Mario Nuñez  Warfarin dose prior to admission: 1mg TThSaSu, no warfarin on MWF  Warfarin indication: h/o DVT  Target INR range: 2-3   Outpatient warfarin provider: Melanie Aggarwal CNP/ Dr Etelvina Johnson    Past Medical History:   Diagnosis Date    [facility] kidney [facility], [facility] IV (severe) ([facility] [address]     GERD (gastroesophageal reflux disease)     Gout     Unspecified essential hypertension     Unspecified vitamin D deficiency                 Recent Labs      03/22/18   1819   INR  2.02*     No results for input(s): HGB, HCT, PLT in the last 72 hours. Current warfarin drug-drug interactions: Aspirin, Azithromycin,       Date INR Warfarin Dose   3/22/2018 2.02 1 mg                                   Daily PT/INR until stable within therapeutic range. Stop heparin sq for inr = 2.02  Thank you for the consult.

## 2018-03-22 NOTE — H&P
History & Physical        Patient:  Angie Medina  YOB: 1929    MRN: [id number]     Acct: [de-identified]    PCP: [address]    Date of Admission: 3/22/2018    Date of Service: Pt seen/examined on  3/23/18 and Admitted to Inpatient with expected LOS greater than two midnights due to medical therapy. Chief Complaint:  Sob and cough 1-2 days      History Of Present Illness:    80 y.o. female who presented to [address] [place] [address] with sob and cough of 1-2 days to the the [address] and was found to have acute resp faulire due to acute CHF plus/minus prob CAP and was treated with iv fluids- 1L bolus but her sob got worse this am and required high flow O2. Did better with iv lasix-40 mg. Family requested for a transfer to [facility] and her cardiologist and Nephrologist were consulted and agreed to  See her at [facility]. Was given zosyn. Sudden in onset,mod in severity but acute got worse. No chest pain,n/v/d --but has had orthopnea,PND but not major leg swelling or calf pain. On arrival,she felt better and required low Flow O2 but her BP was 200/68 with HR 78,RR 24. I d/w the family in detail and she is FULL CODE. Answered their questions. She has HTN and ckd but no copd,dm,cad but she is getting coumadin for??? An old med? Am confirming. Past Medical History:          Diagnosis Date    Chronic kidney disease, stage IV (severe) ([facility])     GERD (gastroesophageal reflux disease)     Gout     Unspecified essential hypertension     Unspecified vitamin D deficiency        Past Surgical History:          Procedure Laterality Date    CATARACT REMOVAL      HIP FRACTURE SURGERY Right     HYSTERECTOMY      KNEE SURGERY Bilateral     arthoscopic       Medications Prior to Admission:      Prior to Admission medications    Medication Sig Start Date End Date Taking?  Authorizing Provider   Calcium Carb-Cholecalciferol (OYSC[employer] D) 250-125 MG-UNIT TABS Take 1 tablet by mouth 2 times daily 1/30/18 1/30/19  Jaiden Chow MD   sodium bicarbonate 650 MG tablet Take 1 tablet by mouth 2 times daily 1/30/18 1/30/19  Jaiden Chow MD   metoprolol tartrate (LOPRESSOR) 50 MG tablet Take 1 tablet by mouth 2 times daily 1/13/18   Rob Port, DO   warfarin (COUMADIN) 1 MG tablet Take 1 mg by mouth Patient takes 1 tablet by mouth on Tue, Thur, Sat, Sun, and no warfarin on Mon, Wed, Fri    Historical Provider, MD   Cholecalciferol (VITAMIN D3) 2000 units CAPS Take 2,000 Units by mouth 2 times daily     Historical Provider, MD   cloNIDine (CATAPRES) 0.1 MG tablet Take 0.1 mg by mouth daily    Historical Provider, MD   omeprazole (PRILOSEC) 20 MG capsule Take 20 mg by mouth daily. Historical Provider, MD       Allergies:  Codeine; Prednisone; and Protonix [pantoprazole sodium]    Social History:      The patient currently lives at home with her daughter    TOBACCO:   reports that she quit smoking about 6 years ago. She has never used smokeless tobacco.  ETOH:   reports that she does not drink alcohol. Family History:      Reviewed in detail and negative for DM, CAD, Cancer, CVA. Positive as follows:        Problem Relation Age of Onset    Diabetes Father     Heart Attack Father        Diet:     cadiac/renal  REVIEW OF SYSTEMS:   Pertinent positives as noted in the HPI. All other systems reviewed and negative. PHYSICAL EXAM:    BP (!) 206/93   Pulse 69   Temp 97.6 °F (36.4 °C) (Oral)   Resp 20   Wt 96 lb 9 oz (43.8 kg)   BMI 19.50 kg/m²     General appearance:mod resp distress, appears stated age and cooperative. HEENT:  Normal cephalic, atraumatic without obvious deformity. Pupils equal, round, and reactive to light. Extra ocular muscles intact. Conjunctivae/corneas clear. Neck: Supple, with full range of motion. jugular venous distention. Trachea midline. Respiratory:   Rales/Wheezes/Rhonchi.   Cardiovascular:  Regular rate and rhythm with normal S1/S2 without murmurs, rubs or gallops. Abdomen: Soft, non-tender, non-distended with normal bowel sounds. Musculoskeletal:  No clubbing, cyanosis ;trace  edema bilaterally. Full range of motion without deformity. Skin: Skin color, texture, turgor normal.  No rashes or lesions. Neurologic:  Neurovascularly intact without any focal sensory/motor deficits. Cranial nerves: II-XII intact, grossly non-focal.  Psychiatric:  Alert and oriented, thought content appropriate, normal insight  Capillary Refill: Brisk,< 3 seconds   Peripheral Pulses: +2 palpable, equal bilaterally       Labs:     No results for input(s): WBC, HGB, HCT, PLT in the last 72 hours. No results for input(s): NA, K, CL, CO2, BUN, CREATININE, CALCIUM, PHOS in the last 72 hours. Invalid input(s): MAGNES  No results for input(s): AST, ALT, BILIDIR, BILITOT, ALKPHOS in the last 72 hours. No results for input(s): INR in the last 72 hours. No results for input(s): Lannon Jacks in the last 72 hours. Urinalysis:      Lab Results   Component Value Date    NITRU POSITIVE 01/10/2018    WBCUA 50-75 01/10/2018    BACTERIA FEW 01/10/2018    RBCUA 15-20 01/10/2018    BLOODU LARGE 01/10/2018    GLUCOSEU NEGATIVE 01/10/2018       Radiology:     CXR: I have reviewed the CXR :Bilateral infiltrates--perihilar  EKG:  I have reviewed the EKG with NSR--unspecific st-t chnages    No orders to display     CBC and BMP reviewed-  UA neg       DVT prophylaxis: [] Lovenox                                 [x] SCDs                                 [] SQ Heparin                                 [] Encourage ambulation           [x] Already on Anticoagulation    Code Status: Prior      PT/OT Eval Status:     Disposition:    [] Home       [] TCU       [] Rehab       [] Psych       [] SNF       [x] [facility]       [] Other-    ASSESSMENT:  Patient transferred to [facility] for acutely worsening resp failure after she was admitted for CHF and prb CAP. She was given a L of NS bolus and sob hgot

## 2018-03-23 NOTE — CARE COORDINATION
3/23/18, 3:27 PM    DISCHARGE BARRIERS      SW received a call from [place] with [facility].   They are able to take clinically and will start a pre-cert and expect to get it back on Mon

## 2018-03-23 NOTE — PROGRESS NOTES
House  Home Layout: One level  Home Access: Stairs to enter without rails  Entrance Stairs - Number of Steps: 1 6inch step to enter home   Home Equipment: Rolling walker, 4 wheeled walker     Bathroom Shower/Tub: Tub/Shower unit  Bathroom Toilet: Standard  Bathroom Equipment: Toilet raiser, Grab bars around toilet, Grab bars in shower  Bathroom Accessibility: Accessible  IADL Comments: Family completes all grocery shopping tasks and lawn work     [employer] Help From: Family, Home health  ADL Assistance: Independent  Homemaking Assistance: Needs assistance  Homemaking Responsibilities: No  Ambulation Assistance: Independent  Transfer Assistance: Independent    Active : No  Occupation: Retired  Additional Comments: Patient states she utilizes rollator for all household mobility, does not ambulate farther distances due to fatigue. Denies O2 use at home. Objective:  RLE PROM: WFL    LLE PROM: WFL    Strength RLE: WFL  Comment: Hip Flexion 4-/5, Knee Extension 4-/5, Knee Flexion 4-/5, Ankle Dorsiflexion 4-/5    Strength LLE: WFL  Comment: Hip Flexion 3+/5, Knee Extension 4-/5, Knee Flexion 4-/5, Ankle Dorsiflexion 4-/5       Sensation  Overall Sensation Status: WFL    Balance  Posture: Good  Sitting - Static: Good  Sitting - Dynamic: Good  Standing - Static: Fair  Standing - Dynamic: Fair  Comments: Patient with slowed missy, kyphotic posture, and easily fatigued with ambulation with RW, making her higher fall risk. Scooting: Contact guard assistance (advancing hips to edge of chair. )    Transfers  Sit to Stand: Contact guard assistance (to RW from bedside chair. Patient required additional time to complete transfer and transfer hands to RW.  Patient kyphotic during standing)  Stand to sit: Contact guard assistance (controlled descent to chair with use of BUE,)     Ambulation 1  Surface: level tile  Device: Rolling Walker  Other Apparatus: O2 (4L)  Assistance: Contact guard assistance  Quality of Gait: with pursed lip breathing to decrease SOB. Patient also has low endurance with limited distance ambulation, making her a higher fall risk. Patient would benefit from continued therapy to improve BLE strength, balance, endurance with all mobility to maximize her functional potential.     Decision Making: High Complexitybased on patient assessment and decision making process of determining plan of care and establishing reasonable expectations for measurable functional outcomes    REQUIRES PT FOLLOW UP: Yes  Discharge Recommendations: Continue to assess pending progress, ECF with PT    Patient Education:  Patient Education: POC, safety and sequencing, pursed lip breathing     Equipment Recommendations:  Equipment Needed: No    Safety:  Type of devices:  All fall risk precautions in place, Call light within reach, Chair alarm in place, Gait belt, Patient at risk for falls, Left in chair, Nurse notified  Restraints  Initially in place: No    Plan:  Times per week: 3-5x GM  Times per day: Daily  Specific instructions for Next Treatment: BLE strengthening, balance, transfers, ambulation, stairs, bed mobility   Current Treatment Recommendations: Strengthening, ROM, Balance Training, Functional Mobility Training, Transfer Training, Gait Training, Stair training, Home Exercise Program, Safety Education & Training, Patient/Caregiver Education & Training, Endurance Training    Goals:  Patient goals : to get better  Short term goals  Time Frame for Short term goals: 2 weeks  Short term goal 1: Patient will perform bed mobility YISEL to sit EOB  Short term goal 2: Patient will perform functional transfers YISEL to sit in bedside chair  Short term goal 3: Patient will ambulate 75 feet with RW and SBA to walk around home safely  Short term goal 4: Patient will negotiate 1 step CGA to enter home safely  Short term goal 5: Patient will perform functional standing activities with Good balance to decrease risk of future falls  Long term

## 2018-03-23 NOTE — CONSULTS
coronary artery disease. 2.    Moderate aortic regurgitation. 3.    No complications. RECOMMENDATION:  At this point:  The patient will be kept overnight for  gentle hydration. We will follow BUN and creatinine in the morning. We will  possibly discharge home to follow up with Dr. Yamile Rice after that. The  patient is scheduled to have colon surgery sometime shortly, this week, we  will decide regarding anticoagulation in the meantime. Oren Beck M.D. Past Medical History:    Past Medical History:   Diagnosis Date    Chronic kidney disease, stage IV (severe) (HCC)     GERD (gastroesophageal reflux disease)     Gout     Unspecified essential hypertension     Unspecified vitamin D deficiency        Past Surgical History:    Past Surgical History:   Procedure Laterality Date    CATARACT REMOVAL      HIP FRACTURE SURGERY Right     HYSTERECTOMY      KNEE SURGERY Bilateral     arthoscopic       Medications Prior to Admission:    Prescriptions Prior to Admission: Calcium Carb-Cholecalciferol (OYSCO D) 250-125 MG-UNIT TABS, Take 1 tablet by mouth 2 times daily  sodium bicarbonate 650 MG tablet, Take 1 tablet by mouth 2 times daily  metoprolol tartrate (LOPRESSOR) 50 MG tablet, Take 1 tablet by mouth 2 times daily  warfarin (COUMADIN) 1 MG tablet, Take 1 mg by mouth Patient takes 1 tablet by mouth on Tue, Thur, Sat, Sun, and no warfarin on Mon, Wed, Fri  Cholecalciferol (VITAMIN D3) 2000 units CAPS, Take 2,000 Units by mouth 2 times daily   cloNIDine (CATAPRES) 0.1 MG tablet, Take 0.1 mg by mouth daily  omeprazole (PRILOSEC) 20 MG capsule, Take 20 mg by mouth daily. Allergies:    Codeine; Prednisone; and Protonix [pantoprazole sodium]    Social History: , 3 of 4 children living, hx smoking   reports that she quit smoking about 6 years ago. She has never used smokeless tobacco. She reports that she does not drink alcohol or use drugs.     Family History:   family history

## 2018-03-23 NOTE — PROGRESS NOTES
Clinical Pharmacy Note    Warfarin consult follow-up    Recent Labs      03/23/18   0430   INR  2.07*     No results for input(s): HGB, HCT, PLT in the last 72 hours. Significant Drug-Drug Interactions:  New warfarin drug-drug interactions: none  Discontinued drug-drug interactions: none      Current warfarin drug-drug interactions: Aspirin, Azithromycin,       Date INR Warfarin Dose   3/22/2018 2.02 1 mg   3/23/2018 2.07 1 mg                                     Daily PT/INR until stable within therapeutic range.

## 2018-03-23 NOTE — CARE COORDINATION
Needed:  Transitional Care  Potential Assistance Purchasing Medications:  No  Does patient want to participate in local refill/ meds to beds program?  No  Type of Home Care Services:  None  Patient expects to be discharged to:  Home with Daughter  Expected Discharge date:  03/26/18  Follow Up Appointment: Best Day/ Time: Tuesday PM    Discharge Plan: lives with daughter; PT/OT following, has walker, current with [facility] ([facility]), has walker; monitor for home oxygen eval if not weaned off otherwise, PCP doses Coumadin   Evaluation: yes

## 2018-03-23 NOTE — PROGRESS NOTES
assistance (To don B socks. )     Bed mobility  Supine to Sit: Minimal assistance  Scooting: Minimal assistance (To scoot hips forward to EOB. )    Transfers  Sit to stand: Contact guard assistance (From EOB with min vc for technique and safety. )  Stand to sit: Contact guard assistance (Onto recliner with min vc for safety. )    Balance  Sitting Balance: Stand by assistance  Standing Balance: Contact guard assistance     Time: 45 seconds  Activity: prep to ambulate      Functional Mobility  Functional - Mobility Device: Rolling Walker  Activity: Other  Assist Level: Contact guard assistance  Functional Mobility Comments: Pt completed 3 steps to recliner, Slow pace, No LOB, small step length, with RW. Activity Tolerance:  Activity Tolerance: Patient limited by fatigue    Treatment Initiated:  OT Evaluation completed. See above for details. Assessment:  Assessment: Pt requires skilled OT intervention to increase indep and safety with all self cares, transfers, and functional mobility to decrease fall risk and return to PLOF. Performance deficits / Impairments: Decreased functional mobility , Decreased ADL status, Decreased strength, Decreased endurance, Decreased high-level IADLs, Decreased safe awareness, Decreased cognition  Prognosis: Fair  Discharge Recommendations: Continue to assess pending progress, 24 hour supervision or assist    Clinical Decision Making: Clinical Decision making was of Moderate Complexity as the result of analysis of data from a detailed assessment, a consideration of several treatment options, the presence of comorbidities affecting the plan of care and the need for minimal to moderate modifications or assistance required to complete the evaluation. Patient Education:  Patient Education: OT POC, OT Role, Transfer safety, Walker safety. Equipment Recommendations:  Equipment Needed: No  Other: Monitor needs/defer to next level of care.      Safety:  Safety Devices in place:

## 2018-03-23 NOTE — CONSULTS
tenderness. Musculoskeletal: No complaints  Genitourinary: No complaints. Hematological: Negative. Denies easy buising  Skin: No itching. Meds    Current Medications    warfarin  1 mg Oral Once    cefTRIAXone (ROCEPHIN) IV  1 g Intravenous Q24H    sodium chloride flush  10 mL Intravenous 2 times per day    ipratropium-albuterol  1 ampule Inhalation Q4H WA    azithromycin  500 mg Intravenous Q24H    furosemide  20 mg Intravenous TID    aspirin  81 mg Oral Daily    calcium-vitamin D  0.5 tablet Oral BID    vitamin D  2,000 Units Oral BID    cloNIDine  0.1 mg Oral Daily    metoprolol tartrate  50 mg Oral BID    pantoprazole  40 mg Oral QAM AC    sodium bicarbonate  650 mg Oral BID    warfarin (COUMADIN) daily dosing (placeholder)   Other RX Placeholder     sodium chloride flush, acetaminophen, magnesium sulfate, potassium chloride **OR** potassium chloride **OR** potassium chloride, morphine **OR** morphine, hydrALAZINE, ondansetron, ipratropium-albuterol  IV Drips/Infusions    Vitals    Vitals    height is 4' 10\" (1.473 m) and weight is 97 lb 8 oz (44.2 kg). Her oral temperature is 98 °F (36.7 °C). Her blood pressure is 130/69 and her pulse is 62. Her respiration is 29 and oxygen saturation is 96%. O2 Flow Rate (L/min): 1.5 L/min  I/O    Intake/Output Summary (Last 24 hours) at 03/23/18 1137  Last data filed at 03/23/18 0902   Gross per 24 hour   Intake            405.2 ml   Output             1350 ml   Net           -944.8 ml     Patient Vitals for the past 96 hrs (Last 3 readings):   Weight   03/23/18 0335 97 lb 8 oz (44.2 kg)   03/22/18 1733 96 lb 9 oz (43.8 kg)     Exam   Constitutional: Patient in bed, resting comfortable in 1 L/NC in NAD. Head: Normocephalic and atraumatic. Mouth/Throat: Oropharynx is clear and moist.  No oral thrush. Eyes: Conjunctivae are normal. Pupils are equal, round, and reactive to light. No scleral icterus. Neck: Neck supple.  No JVD or tracheal deviation EPIC  Echo    Pending  Cultures    Procalcitonin  No results found for: PROCAL  None  Radiology    CXR  None this admission    CT Scans  None this admission    (See actual reports for details)    Assessment   Acute hypoxic respiratory insufficiency likely from acute systolic CHF decompensation with possible CAP vs HCAP  Possible COPD with remote smoking history quitting in 2012, no exacerbation  Hx DVT on coumadin with therapeutic INR  Hx Afib  Essential HTN  CKD stage III  Full Code  Recommendations   CXR  CBC  BC x2, sputum culture, Influenza/Stre/Legionella antigen  PCT level BNP  ABG PRN  Continue Zithromax, add Ceftriaxone and narrow pending cultures  DuoNebs every 4 hours and PRN  IS/Acapella/MetaNeb  Wean O2 to keep SPO2 > 90%  Home O2 evaluation at discharge as needed  Bedside Spirometry if tolerated  PT/OT evaluation and treat  Records from [facility] for labs and testing  Echo is pending  DVT prophylaxis with coumadin    Thank you for the consult and allowing us to participate in the care of your patient. Case discussed with nurse and patient/family. Questions and concerns addressed. Meds and Orders reviewed. Electronically signed by     Payton Bowens CNP on 3/23/2018 at 11:37 AM    Pulmonary edema/pleural effusions due to CHF and CKD stage IV  Comfortable on NC  Loop diuretics TID per primary team  No need for PAP at this time but will leave it PRN  Former smoker--bronchodilators  Borderline elevated procalcitonin, CXR hard to read, cannot r/o pneumonia. Will complete 7 days of antibiotics  Thoracentesis prn if symptoms worsen  Discussed with family    Patient seen and examined independently by me. Above discussed and I agree with Rony Johnson CNP note Also see my additional comments. Labs, cultures, and radiographs when available were reviewed. Changes were made in the orders as necessary. I discussed patient concerns with Kelli's R.N. and instructions were given.  Respiratory care issues

## 2018-03-23 NOTE — PLAN OF CARE
Problem: Pain:  Goal: Pain level will decrease  Pain level will decrease   Outcome: Ongoing  Patient denies pain at time, but has morphine and tylenol for pain  Did give 1 dose of morphine for labored breathing and pain of 6- went down to 2  Goal: Control of acute pain  Control of acute pain   Outcome: Ongoing  Patient denies pain at time, but has morphine and tylenol for pain  Did give 1 dose of morphine for labored breathing and pain of 6- went down to 2  Goal: Control of chronic pain  Control of chronic pain   Outcome: Ongoing  Chronic tingling in toes/feet, states she does not take anything at home for pain     Problem: Falls - Risk of  Goal: Absence of falls  Outcome: Ongoing  No falls this shift. Bed is locked and in lowest position. Pt was instructed on how to use call light and oriented to room. Call light and overhead table within reach. Will continue to monitor   Bed alarm on     Problem: Risk for Impaired Skin Integrity  Goal: Tissue integrity - skin and mucous membranes  Structural intactness and normal physiological function of skin and  mucous membranes. Outcome: Ongoing  Patient refuses to turn as recommended q2hrs at times  Education provided regarding the benefits of repositioning and the risk to skin integrity associated with not repositioning as recommended. Waffle cushion in chair  RN encourages turning patient every 2 hours and PRN   Bony prominences elevated with pillows  Low air loss alternating pressure relief mattress elevated  Heels elevated with pillows         Problem: Cardiovascular  Goal: No DVT, peripheral vascular complications  Outcome: Ongoing  No signs of DVT. No redness/warmth/swelling/pain.  Will continue to monitor   coumadin  Goal: Hemodynamic stability  Outcome: Ongoing  Vitals WNL - patient did go into afib with RVR  Started amio gtt      Problem: Respiratory  Goal: No pulmonary complications  Outcome: Ongoing  Fine crackles in bases  Goal: O2 Sat > 90%  Outcome: Ongoing  Continuous pulse ox  Goal: Supplemental O2 requirements decreased  Outcome: Ongoing  Continuous pulse ox, 1 LNC     Problem: DISCHARGE BARRIERS  Goal: Patient's continuum of care needs are met  Outcome: Ongoing  Possible OSM     Problem: Urinary Elimination:  Goal: Signs and symptoms of infection will decrease  Signs and symptoms of infection will decrease  Outcome: Ongoing  Dixon catheter in place, catheter care done   Goal: Complications related to the disease process, condition or treatment will be avoided or minimized  Complications related to the disease process, condition or treatment will be avoided or minimized  Outcome: Ongoing  No complications noted    Problem: HEMODYNAMIC STATUS  Goal: Patient has stable vital signs and fluid balance  Outcome: Ongoing  Vitals stable, HR afib, on amiodarone gtt      Problem: FLUID AND ELECTROLYTE IMBALANCE  Goal: Fluid and electrolyte balance are achieved/maintained  Outcome: Ongoing  Monitoring inputs and outputs q8hrs   Monitoring electrolyes- mag and K replaced    Comments: Care plan reviewed with patient and family. Patient and family verbalize understanding of the plan of care and contribute to goal setting.

## 2018-03-24 NOTE — PLAN OF CARE
Problem: Pain:  Goal: Pain level will decrease  Pain level will decrease   Outcome: Ongoing  Only discomfort patient complained of this shift of shortness of breath and a headache. PRN breathing TX given for SOB and PRN Tylenol given for headache. Reminded patient to report any pain, pressure, or shortness of breath to the nurse. Goal: Control of acute pain  Control of acute pain   Outcome: Ongoing  Patient reported a headache. 3/10 pain. Tylenol given. Goal: Control of chronic pain  Control of chronic pain   Outcome: Ongoing  Patient has chronic pain in lower back and buttocks. Repositioning patient every two hours and PRN for comfort and to prevent skin breakdown. Problem: Falls - Risk of  Goal: Absence of falls  Outcome: Ongoing  No falls this shift. Patient is a fall risk due to weeks catheter, IV medications, generalized weakness and deconditioning. Patient has PT/OT on board and they got her up to the chair on day shift with one assist and a walker. Patient is alert and oriented and will use the call light appropriately. Bed alarm on. Hourly rounding performed. Problem: Risk for Impaired Skin Integrity  Goal: Tissue integrity - skin and mucous membranes  Structural intactness and normal physiological function of skin and  mucous membranes. Outcome: Ongoing  No new S/SX of skin break down. Patient does have some scattered bruising and blanchable redness to heels, ears and buttocks. Mepilex on oxygen tubing, heels elevated on pillows, allevyn dress on coccyx and turning patient Q2 hours and PRN. Encouraging and assisting patient with turns and repositioning. Low air loss mattress in use. Will continue to monitor. Problem: Cardiovascular  Goal: No DVT, peripheral vascular complications  Outcome: Ongoing  No S/SX of DVT. Patient has Coumadin ordered for DVT prophylaxis.    Goal: Hemodynamic stability  Outcome: Ongoing  Vitals:    03/23/18 1900 03/23/18 2031 03/23/18 2125 03/23/18 2301

## 2018-03-24 NOTE — PROGRESS NOTES
SNF       [x] [facility]       [] Other-    ASSESSMENT:  Patient transferred to [facility] for acutely worsening resp failure after she was admitted for CHF and prb CAP. She was given a L of NS bolus and sob hgot worse and required highFlow O2-15L NC and did better withiv lasix. CXR revleaed more of chf.CAP is not convincing--w/ofever and leucocytosis but still possible. BNP was 3500 but does not have edema and JVP a bit up. DDx: acute resp failure due to  DDX:acuet CHF diastolic vs sys--pending echo  -CAP  -Acute PE      Active Hospital Problems    Diagnosis Date Noted    Acute on chronic diastolic CHF (congestive heart failure) (HCC) [I50.33]      Priority: High    Acute on chronic congestive heart failure (HCC) [I50.9]     Pleural effusion, bilateral [J90]     Hypokalemia [E87.6]     Hypomagnesemia [E83.42]     Respiratory failure ([facility] [address] [J96.90] 03/22/2018    CAP (community acquired pneumonia) [J18.9] 03/22/2018    Moderate malnutrition ([facility] [address] [E44.0] 01/11/2018    History of DVT (deep vein thrombosis) [Z86.718] 04/07/2017    TRACI (acute kidney injury) ([facility] [address] [N17.9] 72/28/7372    Systolic hypertension, isolated [I10] 02/22/2013    Chronic kidney disease, stage IV (severe) ([facility] [address] [N18.4] 02/21/2013    GERD (gastroesophageal reflux disease) [K21.9] 02/21/2013    Vitamin D deficiency [E55.9] 02/21/2013   FTT  Falls    PLAN:  Acute diasolic chf exhab- on iv lasix tid, Monitor daily weights, strict  Intake and Out put, monitor K, Ical , Mag, BMP daily, replace lytes accordingly    ? pna - on abs, per pulm, f/u cultures    traci on ckd - 4 - - likely sec to diuertics- On iv diuretics per nephrology, follow bmp and elctrolytes, plan to decrease dose chintan    Ex smoker, duoneb     htn- essential - controlled          Thank you [address] for the opportunity to be involved in this patient's care.     Electronically signed by Salvador Kwan MD on 3/24/2018 at 4:44 PM

## 2018-03-24 NOTE — FLOWSHEET NOTE
03/24/18 221   Provider Notification   Reason for Communication Evaluate  (Creatinine 3.0)   Provider Name Dr. Alyssa Mariee    Provider Notification Physician   Method of Communication Secure Message   Response No new orders   Notification Time 0622

## 2018-03-24 NOTE — PROGRESS NOTES
Spoke with Dr. Martin Us regarding coumadin. Stated to keep pt on coumadin at this time and PCP can continue to follow pt regarding this.

## 2018-03-24 NOTE — PLAN OF CARE
Problem: Impaired respiratory status  Goal: Clear lung sounds  Outcome: Ongoing  Breath sounds clear with fine crackles noted- continue tx's as ordered

## 2018-03-24 NOTE — PROGRESS NOTES
Extremities: no cyanosis, clubbing or edema, pulses present   Skin: warm and dry, no rash or erythema  Head: normocephalic and atraumatic  Musculoskeletal: normal range of motion, no joint swelling, deformity or tenderness  Neurological: alert, oriented, normal speech, no focal findings or movement disorder noted    Medications:    warfarin  0.5 mg Oral Once    [START ON 3/25/2018] furosemide  20 mg Intravenous BID    lactulose  20 g Oral BID    cefTRIAXone (ROCEPHIN) IV  1 g Intravenous Q24H    sodium chloride flush  10 mL Intravenous 2 times per day    ipratropium-albuterol  1 ampule Inhalation Q4H WA    azithromycin  500 mg Intravenous Q24H    aspirin  81 mg Oral Daily    calcium-vitamin D  0.5 tablet Oral BID    vitamin D  2,000 Units Oral BID    metoprolol tartrate  50 mg Oral BID    pantoprazole  40 mg Oral QAM AC    sodium bicarbonate  650 mg Oral BID    warfarin (COUMADIN) daily dosing (placeholder)   Other RX Placeholder         sodium chloride flush 10 mL PRN   acetaminophen 650 mg Q4H PRN   magnesium sulfate 1 g PRN   potassium chloride 40 mEq PRN   Or     potassium chloride 40 mEq PRN   Or     potassium chloride 10 mEq PRN   hydrALAZINE 10 mg Q4H PRN   ondansetron 4 mg Q6H PRN   ipratropium-albuterol 1 ampule Q1H PRN       Diagnostics:  EK-MAR-2018 22:20:41 [facility] ROUTINE RETRIEVAL  Sinus bradycardia  Left axis deviation  ST & T wave abnormality, consider lateral ischemia  Prolonged QT interval or tu fusion, consider myocardial disease, electrolyte imbalance, or drug effects  Abnormal ECG  When compared with ECG of 23-MAR-2018 16:26,  Sinus rhythm has replaced Atrial fibrillation  Ventricular rate has decreased BY 77 BPM  Confirmed by Betty Yadav ([id number]) on 3/24/2018 8:58:19 AM    Echo:   Electronically signed by Zaria Amaya MD (Interpreting  Hubert Yo) on 2018 at 02:55

## 2018-03-25 NOTE — PROGRESS NOTES
QAM AC    sodium bicarbonate  650 mg Oral BID    warfarin (COUMADIN) daily dosing (placeholder)   Other RX Placeholder     Continuous Infusions:    PRN Meds:.sodium chloride flush, acetaminophen, magnesium sulfate, potassium chloride **OR** potassium chloride **OR** potassium chloride, hydrALAZINE, ondansetron, ipratropium-albuterol    Diet:  DIET CARDIAC;  cadiac/renal  REVIEW OF SYSTEMS:   Pertinent positives as noted in the HPI. All other systems reviewed and negative. PHYSICAL EXAM:    BP (!) 131/58   Pulse 72   Temp 98.2 °F (36.8 °C) (Oral)   Resp 18   Ht 4' 10\" (1.473 m)   Wt 97 lb 1.6 oz (44 kg)   SpO2 95%   BMI 20.29 kg/m²     General appearance:mod resp distress, appears stated age and cooperative. HEENT:  Normal cephalic, atraumatic without obvious deformity. Pupils equal, round, and reactive to light. Extra ocular muscles intact. Conjunctivae/corneas clear. Neck: Supple, with full range of motion. jugular venous distention. Trachea midline. Respiratory:   Rales/Wheezes/Rhonchi. Cardiovascular:  Regular rate and rhythm with normal S1/S2 without murmurs, rubs or gallops. Abdomen: Soft, non-tender, non-distended with normal bowel sounds. Musculoskeletal:  No clubbing, cyanosis ;trace  edema bilaterally. Full range of motion without deformity. Skin: Skin color, texture, turgor normal.  No rashes or lesions. Neurologic:  Neurovascularly intact without any focal sensory/motor deficits.  Cranial nerves: II-XII intact, grossly non-focal.  Psychiatric:  Alert and oriented, thought content appropriate, normal insight  Capillary Refill: Brisk,< 3 seconds   Peripheral Pulses: +2 palpable, equal bilaterally       Labs:     Recent Labs      03/23/18   1210   WBC  8.7   HGB  10.8*   HCT  32.0*   PLT  133     Recent Labs      03/23/18   0430  03/24/18   0428  03/25/18   0417   NA  144  134*  133*   K  3.4*  3.7  3.7  5.3*   CL  102  93*  93*   CO2  26  24  25   BUN  33*  36*  36*   CREATININE  2.4* by Dr. Erich Crawford on 3/22/2018 8:56 PM        CBC and BMP reviewed-  UA neg       DVT prophylaxis: [] Lovenox                                 [x] SCDs                                 [] SQ Heparin                                 [] Encourage ambulation           [x] Already on Anticoagulation    Code Status: Full Code      PT/OT Eval Status:     Disposition:    [] Home       [] TCU       [] Rehab       [] Psych       [] SNF       [x] [place]       [] Other-    ASSESSMENT:        C/Elise Burton 1106 Problems    Diagnosis Date Noted    Acute on chronic diastolic CHF (congestive heart failure) ([facility] [I50.33]      Priority: High    Acute on chronic congestive heart failure (HCC) [I50.9]     Pleural effusion, bilateral [J90]     Hypokalemia [E87.6]     Hypomagnesemia [E83.42]     Respiratory failure ([facility] [J96.90] 03/22/2018    CAP (community acquired pneumonia) [J18.9] 03/22/2018    Moderate malnutrition ([facility] [E44.0] 01/11/2018    History of DVT (deep vein thrombosis) [Z86.718] 04/07/2017    TRACI (acute kidney injury) ([facility] [N17.9] 16/74/9307    Systolic hypertension, isolated [I10] 02/22/2013    Chronic kidney disease, stage IV (severe) ([facility] [N18.4] 02/21/2013    GERD (gastroesophageal reflux disease) [K21.9] 02/21/2013    Vitamin D deficiency [E55.9] 02/21/2013   FTT  Falls    PLAN:  Acute diasolic chf exhab- on iv lasix tid, Monitor daily weights, strict  Intake and Out put, monitor K, Ical , Mag, BMP daily, replace lytes accordingly- CHANGED TO ORAL LASIX TODAY PER NEPHRO    ? pna - on abs, per pulm, f/u cultures    traci on ckd - 4 - - likely sec to diuertics- IMPROVING    Ex smoker, duoneb     htn- essential - controlled    PLANS FOR DISCHARGE KARMEN    PT OT          Thank you SHELLY Pearson for the opportunity to be involved in this patient's care.     Electronically signed by Leah Eng MD on 3/25/2018 at 3:11 PM

## 2018-03-25 NOTE — PROGRESS NOTES
[facility]   Nephrology progress note  3/25/2018, 12:08 PM      Pt Name:    Akua Anthony  MRN:     [id number]     [place]:    2/13/1929  Admit Date:    3/22/2018  5:25 PM  Primary Care Physician:  Kena Culver   Room number  4K-11/011-A    Chief Complaint: Nephrology following for TRACI/CKD    Subjective:  Patient seen and examined  No chest pain at this time  Breathing better  Good urine output  Family in room    Objective:  24HR INTAKE/OUTPUT:      Intake/Output Summary (Last 24 hours) at 03/25/18 1208  Last data filed at 03/25/18 0406   Gross per 24 hour   Intake          1012.92 ml   Output             1700 ml   Net          -687.08 ml     I/O last 3 completed shifts: In: 1282.9 [P.O.:970; I.V.:312.9]  Out: 1700 [Urine:1700]  No intake/output data recorded. Admission weight: 96 lb 9 oz (43.8 kg)  Body mass index is 20.29 kg/m². Physical examination  VITALS:     Vitals:    03/25/18 1115   BP: (!) 170/70   Pulse: 72   Resp: 18   Temp: 98.2 °F (36.8 °C)   SpO2: 95%     General Appearance: alert and cooperative with exam, appears comfortable, no distress  Mouth/Throat: Oral mucosa moist  Neck: No JVD  Lungs: Air entry B/L, no rales  Heart:  S1, S2 heard  GI: soft, non-tender, no guarding  Extremities: no LE edema      Lab Data  CBC:   Recent Labs      03/23/18   1210   WBC  8.7   HGB  10.8*   HCT  32.0*   PLT  133     BMP:  Recent Labs      03/23/18   0430  03/24/18   0428  03/25/18   0417   NA  144  134*  133*   K  3.4*  3.7  3.7  5.3*   CL  102  93*  93*   CO2  26  24  25   BUN  33*  36*  36*   CREATININE  2.4*  3.0*  2.8*   GLUCOSE  90  105  108   CALCIUM  8.7  8.4*  9.3   MG  1.5*  1.9   --      Hepatic: No results for input(s): LABALBU, AST, ALT, ALB, BILITOT, ALKPHOS in the last 72 hours.       Meds:  Infusion:   Meds:    warfarin  1 mg Oral Once    furosemide  20 mg Intravenous BID    lactulose  20 g Oral BID    isosorbide dinitrate  5 mg Oral TID    hydrALAZINE  10 mg

## 2018-03-25 NOTE — PROGRESS NOTES
[facility]    Patient - Trevor Lucia   MRN -  [id number]   [facility] # - [de-identified]   - 1929      Date of Admission -  3/22/2018  5:25 PM  Date of evaluation -  3/25/2018  Room - Via Terry Fraser MD Primary Care Physician - [address]   Chief Complaint/Reason for consult   Sob/pleural effusions  [address] Problems    Diagnosis Date Noted    Acute on chronic diastolic CHF (congestive heart failure) (HCC) [I50.33]      Priority: High    Acute on chronic congestive heart failure (HCC) [I50.9]     Pleural effusion, bilateral [J90]     Hypokalemia [E87.6]     Hypomagnesemia [E83.42]     Respiratory failure ([address] [J96.90] 2018    CAP (community acquired pneumonia) [J18.9] 2018    Moderate malnutrition ([address] [E44.0] 2018    History of DVT (deep vein thrombosis) [Z86.718] 2017    TRACI (acute kidney injury) ([address] [N17.9]     Systolic hypertension, isolated [I10] 2013    Chronic kidney disease, stage IV (severe) ([address] [N18.4] 2013    GERD (gastroesophageal reflux disease) [K21.9] 2013    Vitamin D deficiency [E55.9] 2013     HPI   Trevor Lucia is a 80 y.o. female admitted for worsening respiratory distress. It appears she was admitted to [facility] for worsening cough and SOB and was treated for acute respiratory failure with possible pneumonia. There are no records available for review. She was given IVF and treated with Zosyn and developed worsening hypoxia, placed on HF02 and family requested transfer here. She has been admitted under the Hospitalist service, pulmonary is consulted for further evaluation and management. Patient is a poor historian and information is obtained by daughter with whom she temporarily  lives. She was recently discharged after a fall.  She developed symptoms of cold with cough 2 days AMYLASE  TROP  Lab Results   Component Value Date    TROPONINT 0.019 03/23/2018    TROPONINT 0.014 03/22/2018    TROPONINT 0.018 01/10/2018     BNP  Lab Results   Component Value Date    PROBNP 89808.0 03/23/2018     D-Dimer  Lab Results   Component Value Date    DDIMER 855.00 03/22/2018     Lactic Acid  No results for input(s): LACTA in the last 72 hours. INR  Recent Labs      03/23/18   0430  03/24/18   0428  03/25/18   0827   INR  2.07*  2.49*  2.23*     PTT  No results for input(s): APTT in the last 72 hours. Glucose  No results for input(s): POCGLU in the last 72 hours. UA No results for input(s): SPECGRAV, PHUR, COLORU, CLARITYU, MUCUS, PROTEINU, BLOODU, RBCUA, WBCUA, BACTERIA, NITRU, GLUCOSEU, BILIRUBINUR, UROBILINOGEN, KETUA, LABCAST, LABCASTTY, AMORPHOS in the last 72 hours. Invalid input(s): CRYSTALS. PFTs   None in EPIC  Echo    Summary   Left ventricle size and systolic function is normal.   Normal left ventricular wall thickness. Unable to determine wall motion abnormalities due to poor image quality. Doppler parameters were consistent with abnormal left ventricular   relaxation (grade 1 diastolic dysfunction). Moderately dilated left atrium. The aortic valve appears to be trileaflet with good leaflet separation. Aortic valve leaflets are Mildly calcified. Mild-to-moderate aortic regurgitation is noted. Mitral annular calcification is present. Calcification of the mitral valve   noted. The peak gradient was 2 mmHg, and the mean gradient was 2 mmHg. Mitral   valve area was 1.75 cm2 by (P1/2t)   Mild pulmonic regurgitation visualized. Pulmonary artery systolic pressure calculated from tricuspid regurgitation   jet is 40-45 mmHg.  Breanna Jeni      Signature      ----------------------------------------------------------------   Electronically signed by Ronaldo Levine MD (Interpreting   physician) on 03/23/2018 at 02:55 PM    Cultures    Procalcitonin  Lab Results   Component Value Date

## 2018-03-26 NOTE — PLAN OF CARE
Problem: Impaired respiratory status  Goal: Clear lung sounds  Improve breath sounds, increase aeration and decrease WOB. Outcome: Ongoing  Improve breath sounds, increase aeration and decrease WOB.

## 2018-03-26 NOTE — DISCHARGE SUMMARY
positioning of the patient. Poor inflation of lungs. Mild cardiac megaly. 2. Moderate size bilateral pleural effusions. Increased interstitial markings both central lung fields. Findings suggestive of heart failure with pulmonary edema. Cannot exclude pneumonia. **This report has been created using voice recognition software. It may contain minor errors which are inherent in voice recognition technology. **      Final report electronically signed by Dr. Malvin Francisco on 3/23/2018 1:13 PM      VL DUP LOWER EXTREMITY VENOUS RIGHT   Final Result   No evidence of right lower extremity DVT. **This report has been created using voice recognition software. It may contain minor errors which are inherent in voice recognition technology. **      Final report electronically signed by Dr. Arty Klinefelter on 3/22/2018 8:56 PM      XR CHEST STANDARD (2 VW)    (Results Pending)     CBC and BMP reviewed-  UA neg       DVT prophylaxis: [] Lovenox                                 [x] SCDs                                 [] SQ Heparin                                 [] Encourage ambulation           [x] Already on Anticoagulation    Code Status: Full Code      PT/OT Eval Status:     Disposition:    [] Home       [] TCU       [] Rehab       [] Psych       [] SNF       [x] [facility]       [] Other-    ASSESSMENT:        C/Elise [address] Problems    Diagnosis Date Noted    Acute on chronic diastolic CHF (congestive heart failure) (HCC) [I50.33]      Priority: High    Acute on chronic congestive heart failure (HCC) [I50.9]     Pleural effusion, bilateral [J90]     Hypokalemia [E87.6]     Hypomagnesemia [E83.42]     Respiratory failure ([address] [J96.90] 03/22/2018    CAP (community acquired pneumonia) [J18.9] 03/22/2018    Moderate malnutrition ([address] [E44.0] 01/11/2018    History of DVT (deep vein thrombosis) [Z86.718] 04/07/2017    TRACI (acute kidney injury) ([address] [N17.9] 63/39/9401    Systolic

## 2018-03-26 NOTE — PROGRESS NOTES
1:18 PM Attempted to make a follow up appointment with Dr. Alicia Castro.  Office was closed at 1:00pm

## 2018-03-26 NOTE — PROGRESS NOTES
Clinical Pharmacy Note    Warfarin consult follow-up    Recent Labs      03/26/18   0342   INR  2.05*     Recent Labs      03/23/18   1210   HGB  10.8*   HCT  32.0*   PLT  133       Significant Drug-Drug Interactions:  New warfarin drug-drug interactions: none  Discontinued drug-drug interactions: none  Current warfarin drug-drug interactions: aspirin, azithromycin     Date INR Warfarin Dose   3/22/2018 2.02 1 mg   3/23/2018 2.07 1 mg   3/24/2018 2.49  0.5 mg   3/25/2018 2.23 1 mg    3/26/2018 2.05   0.5 mg                  Notes:                     Daily PT/INR until stable within therapeutic range.      Luis Carlos Gan PharmD, BCPS   3/26/2018  7:27 AM

## 2018-03-26 NOTE — PROGRESS NOTES
Component Value Date    PROBNP 16369.0 03/23/2018     D-Dimer  Lab Results   Component Value Date    DDIMER 855.00 03/22/2018     Lactic Acid  No results for input(s): LACTA in the last 72 hours. INR  Recent Labs      03/24/18   0428  03/25/18   0827  03/26/18   0342   INR  2.49*  2.23*  2.05*     PTT  No results for input(s): APTT in the last 72 hours. Glucose  No results for input(s): POCGLU in the last 72 hours. UA No results for input(s): SPECGRAV, PHUR, COLORU, CLARITYU, MUCUS, PROTEINU, BLOODU, RBCUA, WBCUA, BACTERIA, NITRU, GLUCOSEU, BILIRUBINUR, UROBILINOGEN, KETUA, LABCAST, LABCASTTY, AMORPHOS in the last 72 hours. Invalid input(s): CRYSTALS. PFTs   None in EPIC  Echo    Summary   Left ventricle size and systolic function is normal.   Normal left ventricular wall thickness. Unable to determine wall motion abnormalities due to poor image quality. Doppler parameters were consistent with abnormal left ventricular   relaxation (grade 1 diastolic dysfunction). Moderately dilated left atrium. The aortic valve appears to be trileaflet with good leaflet separation. Aortic valve leaflets are Mildly calcified. Mild-to-moderate aortic regurgitation is noted. Mitral annular calcification is present. Calcification of the mitral valve   noted. The peak gradient was 2 mmHg, and the mean gradient was 2 mmHg. Mitral   valve area was 1.75 cm2 by (P1/2t)   Mild pulmonic regurgitation visualized. Pulmonary artery systolic pressure calculated from tricuspid regurgitation   jet is 40-45 mmHg.  Orlando Gamez      Signature      ----------------------------------------------------------------   Electronically signed by Darnell Marinelli MD (Interpreting   physician) on 03/23/2018 at 02:55 PM    Cultures    Procalcitonin  Lab Results   Component Value Date    PROCAL 0.41 03/23/2018     Rapid Flu A/B-Negative  Blood Culture #1-No prelim growth  Blood Culture #2-No prelim growth  MRSA-Negative  VRE-Negative  Strep

## 2018-03-26 NOTE — PLAN OF CARE
Problem: Pain:  Goal: Pain level will decrease  Pain level will decrease   Outcome: Ongoing  Patient complaining of generalized pain. She is taking PRN tylenol. Pain goal '5'. Patient states this is chronic pain. Problem: Falls - Risk of  Goal: Absence of falls  Outcome: Ongoing  No falls this shift. Patient is working with therapy. She requires help, walker, and gait belt. PT/OT on patient's case. Bed/chair alarms used, patient has not set off alarms. Problem: Risk for Impaired Skin Integrity  Goal: Tissue integrity - skin and mucous membranes  Structural intactness and normal physiological function of skin and  mucous membranes. Outcome: Ongoing  No new skin integrity issues this shift. Patient has new allevyn placed on sacrum. She needs help with turning and repositioning in bed. Problem: Cardiovascular  Goal: Hemodynamic stability  Outcome: Ongoing  Patient in NSR. No signs of swelling. Problem: Respiratory  Goal: No pulmonary complications  Outcome: Ongoing  Lung sounds clear. Monitoring pulse ox continuously. Goal: O2 Sat > 90%  Outcome: Met This Shift  On room air. Goal: Supplemental O2 requirements decreased  Outcome: Met This Shift  Weaned to room air. Problem: DISCHARGE BARRIERS  Goal: Patient's continuum of care needs are met  Outcome: Ongoing  Patient is from home with daughter. Planning home today with home health. Problem: Urinary Elimination:  Goal: Signs and symptoms of infection will decrease  Signs and symptoms of infection will decrease   Outcome: Completed Date Met: 03/26/18  Dixon catheter removed today. Goal: Complications related to the disease process, condition or treatment will be avoided or minimized  Complications related to the disease process, condition or treatment will be avoided or minimized   Outcome: Completed Date Met: 03/26/18  Dixon catheter removed today.      Problem: HEMODYNAMIC STATUS  Goal: Patient has stable vital signs and fluid balance  Outcome: Ongoing  Vital signs stable. Having good urine output. Urine clear and yellow. No signs of swelling. Problem: FLUID AND ELECTROLYTE IMBALANCE  Goal: Fluid and electrolyte balance are achieved/maintained  Outcome: Met This Shift  No signs of swelling. Vitals stable. Comments: Care plan reviewed with patient. Patient verbalizes understanding of the plan of care and contribute to goal setting.

## 2018-03-26 NOTE — CARE COORDINATION
3/26/18, 10:18 AM    DISCHARGE BARRIERS      Received a call from Melvin Gordon at [facility], [facility] to determine if patient would be ready for discharge today if precert returned. SW left message with Jonatan Raphael, daughter to determine discharge plan home with family versus ECF. Morning report states family may want patient to return home. Will return call to Melvin Gordon with plan. Update: 11:30 AM- Spoke to [id number] who states patient plans home with current [facility]. Spoke to Madonna Mayes at [facility] who states patient will need a new HH order prior to discharge. Update: 1:00 PM- Spoke to patient who states he will return home with Jonatan Raphael at discharge with [facility]. Faxed information to [facility]. Spoke to Jonatan Raphael who states patient can return home with her and current HH. Jonatan Raphael asked about setting up ECF from the community. SW advised her to call ECF if needed for placement. Left a voicemail with Melvin Gordon at [facility] to make her aware of discharge plan. Patient plans home with new [facility]. Patient and Jonatan Raphael are agreeable to discharge plan. 3/26/18, 1:09 PM    Discharge plan discussed by  and . Discharge plan reviewed with patient/ family. Patient/ family verbalize understanding of discharge plan and are in agreement with plan. Understanding was demonstrated using the teach back method. IMM Letter  IMM Letter given to Patient/Family/Significant other/Guardian/POA/by[de-identified]   IMM Letter date given[de-identified] 03/26/18  IMM Letter time given[de-identified] 0933     Update: 3:25 PM- Met with patient and family per Jenna CARMEN request.  Family states they wanted patient to go to an ECF. SW made them aware of conversation SW had with patient. ALONA spoke to Melvin Gordon who states they have not received the precert back and she could continue it but they cannot take as an early out and could not accept patient if discharged home.   Melvin Gordon states precert may return

## 2018-03-26 NOTE — PROGRESS NOTES
[facility]   Nephrology progress note  3/26/2018, 6:00 PM      Pt Name:    Lexi Back  MRN:     [id number]     [place]:    2/13/1929  Admit Date:    3/22/2018  5:25 PM  Primary Care Physician:  Kena Culver   Room number  4K-11/011-A    Chief Complaint: Nephrology following for TRACI/CKD    Subjective:  Patient seen and examined  This is late entry  Seen earlier during rounds  Family in room (daughter and son-in-law)  Breathing better    Objective:  24HR INTAKE/OUTPUT:      Intake/Output Summary (Last 24 hours) at 03/26/18 1800  Last data filed at 03/26/18 1520   Gross per 24 hour   Intake          1046.33 ml   Output             1351 ml   Net          -304.67 ml     I/O last 3 completed shifts: In: 1046.3 [P.O.:720; I.V.:326.3]  Out: 1350 [Urine:1350]  I/O this shift:  In: -   Out: 1 [Urine:1]  Admission weight: 96 lb 9 oz (43.8 kg)  Body mass index is 20.21 kg/m². Physical examination  VITALS:   BP in 905B systolic, HR 82, R 20  Vitals:    03/26/18 1122   BP:    Pulse:    Resp:    Temp:    SpO2: 92%     General Appearance: alert and cooperative with exam, appears comfortable, no distress  Mouth/Throat: Oral mucosa moist  Neck: No JVD  Lungs: Air entry B/L, no rales  Heart:  S1, S2 heard  GI: soft, non-tender, no guarding  Extremities: no LE edema    Lab Data  CBC:   No results for input(s): WBC, HGB, HCT, PLT in the last 72 hours. BMP:  Recent Labs      03/24/18   0428  03/25/18   0417  03/26/18   0342   NA  134*  133*  132*   K  3.7  3.7  5.3*  4.4   CL  93*  93*  90*   CO2  24  25  29   BUN  36*  36*  33*   CREATININE  3.0*  2.8*  2.7*   GLUCOSE  105  108  116*   CALCIUM  8.4*  9.3  9.2   MG  1.9   --    --      Hepatic: No results for input(s): LABALBU, AST, ALT, ALB, BILITOT, ALKPHOS in the last 72 hours.       Meds:  Infusion:   Meds:    furosemide  20 mg Oral BID    isosorbide dinitrate  5 mg Oral TID    hydrALAZINE  10 mg Oral 2 times per day    cefTRIAXone

## 2018-03-26 NOTE — PROGRESS NOTES
for chair before sitting for decreased fall risk. )       Ambulation 1  Surface: level tile  Device: Rolling Walker  Assistance: Contact guard assistance  Quality of Gait: Slow missy with decreased B step length. Kyphotic posture with cuing for decreased downward gaze. One cue to stay within NICHOLAS of AD for decreased fall risk. Distance: 50 feet x1 and 25 feet x1        Balance  Comments: Dynamic  bathroom to cmoplete bruno clean up and to wash hands in sink requiring contact guard assist for safety. Exercises:  Exercises  Comments: Reclined in bedside chair pt completed BLE ankle pumps, hip abd/add, heel slides, quad set, SLR all x10 reps to increase strength for improved functional mobility. Activity Tolerance:  Activity Tolerance: Patient limited by fatigue;Patient Tolerated treatment well;Patient limited by endurance    Assessment: Body structures, Functions, Activity limitations: Decreased functional mobility , Decreased ROM, Decreased strength, Decreased endurance, Decreased balance  Assessment: Pt tolerated session fairly well. Limited by decreased strength, decreased mobility, decreased endurance. Would benefit from continued therapy at [address] [place] [address]. Prognosis: Good  REQUIRES PT FOLLOW UP: Yes  Discharge Recommendations: Continue to assess pending progress, ECF with PT    Patient Education:  Patient Education: Sean Kauffman training. Safety with gait. POC    Equipment Recommendations:  Equipment Needed: No    Safety:  Type of devices:  All fall risk precautions in place, Call light within reach, Chair alarm in place, Gait belt, Patient at risk for falls, Left in chair, Nurse notified  Restraints  Initially in place: No    Plan:  Times per week: 3-5x GM  Times per day: Daily  Specific instructions for Next Treatment: BLE strengthening, balance, transfers, ambulation, stairs, bed mobility   Current Treatment Recommendations: Strengthening, ROM, Balance Training, Functional Mobility Training,

## 2018-04-11 PROBLEM — E86.0 DEHYDRATION: Status: RESOLVED | Noted: 2017-04-04 | Resolved: 2018-01-01

## 2023-06-21 NOTE — PROGRESS NOTES
ipratropium-albuterol  1 ampule Inhalation Q4H WA    azithromycin  500 mg Intravenous Q24H    furosemide  20 mg Intravenous TID    aspirin  81 mg Oral Daily    calcium-vitamin D  0.5 tablet Oral BID    vitamin D  2,000 Units Oral BID    metoprolol tartrate  50 mg Oral BID    pantoprazole  40 mg Oral QAM AC    sodium bicarbonate  650 mg Oral BID    warfarin (COUMADIN) daily dosing (placeholder)   Other RX Placeholder     Meds prn: sodium chloride flush, acetaminophen, magnesium sulfate, potassium chloride **OR** potassium chloride **OR** potassium chloride, hydrALAZINE, ondansetron, ipratropium-albuterol       Impression and Plan:  1. TRACI on CKD IV: likely due to diuresis  - patient however reports symptomatic improvement with lasix  - admitted with CHF, volume status improved reportedly  - good urine output  - check labs in am  - reduce IV lasix BID    2. Mild hypokalemia: replace, due to diuretics  3.  Grade I diastolic dysfunction: improved clinically, good diuresis, volume status improved today    D/W patient and RN  D/W family    Claudy Encarnacion MD  [facility] Depth Of Biopsy: dermis